# Patient Record
Sex: MALE | Race: BLACK OR AFRICAN AMERICAN | NOT HISPANIC OR LATINO | Employment: OTHER | URBAN - METROPOLITAN AREA
[De-identification: names, ages, dates, MRNs, and addresses within clinical notes are randomized per-mention and may not be internally consistent; named-entity substitution may affect disease eponyms.]

---

## 2020-09-18 ENCOUNTER — TELEPHONE (OUTPATIENT)
Dept: PAIN MEDICINE | Facility: CLINIC | Age: 70
End: 2020-09-18

## 2020-09-18 NOTE — TELEPHONE ENCOUNTER
Tried to call to r/s appt on 09/29, due to dr out . No answer. Left   Alexis Boyd, MA [facility]   [facility]

## 2020-09-24 ENCOUNTER — OFFICE VISIT (OUTPATIENT)
Dept: PAIN MEDICINE | Facility: CLINIC | Age: 70
End: 2020-09-24
Payer: MEDICARE

## 2020-09-24 VITALS
BODY MASS INDEX: 23.54 KG/M2 | HEIGHT: 69 IN | SYSTOLIC BLOOD PRESSURE: 157 MMHG | HEART RATE: 93 BPM | DIASTOLIC BLOOD PRESSURE: 84 MMHG | WEIGHT: 158.94 LBS

## 2020-09-24 DIAGNOSIS — M47.812 CERVICAL FACET JOINT SYNDROME: ICD-10-CM

## 2020-09-24 DIAGNOSIS — M79.18 MYOFASCIAL MUSCLE PAIN: ICD-10-CM

## 2020-09-24 DIAGNOSIS — M47.812 CERVICAL SPONDYLOSIS: Primary | ICD-10-CM

## 2020-09-24 PROCEDURE — 99213 PR OFFICE/OUTPT VISIT, EST, LEVL III, 20-29 MIN: ICD-10-PCS | Mod: S$PBB,,, | Performed by: ANESTHESIOLOGY

## 2020-09-24 PROCEDURE — 99214 OFFICE O/P EST MOD 30 MIN: CPT | Mod: PBBFAC | Performed by: ANESTHESIOLOGY

## 2020-09-24 PROCEDURE — 99999 PR PBB SHADOW E&M-EST. PATIENT-LVL IV: ICD-10-PCS | Mod: PBBFAC,,, | Performed by: ANESTHESIOLOGY

## 2020-09-24 PROCEDURE — 99999 PR PBB SHADOW E&M-EST. PATIENT-LVL IV: CPT | Mod: PBBFAC,,, | Performed by: ANESTHESIOLOGY

## 2020-09-24 PROCEDURE — 99213 OFFICE O/P EST LOW 20 MIN: CPT | Mod: S$PBB,,, | Performed by: ANESTHESIOLOGY

## 2020-09-24 RX ORDER — OXYCODONE HCL 20 MG/1
20 TABLET, FILM COATED, EXTENDED RELEASE ORAL
COMMUNITY

## 2020-09-24 RX ORDER — OXYCODONE HYDROCHLORIDE 30 MG/1
30 TABLET, FILM COATED, EXTENDED RELEASE ORAL
COMMUNITY

## 2020-09-24 RX ORDER — METOPROLOL TARTRATE 50 MG/1
50 TABLET ORAL 2 TIMES DAILY
COMMUNITY

## 2020-09-24 RX ORDER — TIZANIDINE 2 MG/1
2 TABLET ORAL
COMMUNITY
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
## 2020-09-30 ENCOUNTER — TELEPHONE (OUTPATIENT)
Dept: PAIN MEDICINE | Facility: CLINIC | Age: 70
End: 2020-09-30

## 2020-09-30 DIAGNOSIS — M79.18 MYOFASCIAL MUSCLE PAIN: ICD-10-CM

## 2020-09-30 DIAGNOSIS — M47.812 CERVICAL FACET JOINT SYNDROME: ICD-10-CM

## 2020-09-30 DIAGNOSIS — M47.812 CERVICAL SPONDYLOSIS: Primary | ICD-10-CM

## 2020-09-30 NOTE — TELEPHONE ENCOUNTER
----- Message from Kandace Mccall LPN sent at 9/30/2020  2:38 PM CDT -----  Contact: [phone] self    ----- Message -----  From: Thuy Edwards  Sent: 9/30/2020   2:18 PM CDT  To: [facility] Staff    Patient would like to consult with nurse regarding an referral for a physical therapy  . Please call back at [phone]. Thanks

## 2020-10-06 ENCOUNTER — CLINICAL SUPPORT (OUTPATIENT)
Dept: REHABILITATION | Facility: HOSPITAL | Age: 70
End: 2020-10-06
Payer: MEDICARE

## 2020-10-06 DIAGNOSIS — R29.898 DECREASED RANGE OF MOTION OF NECK: ICD-10-CM

## 2020-10-06 DIAGNOSIS — R29.3 POOR POSTURE: ICD-10-CM

## 2020-10-06 DIAGNOSIS — M47.812 CERVICAL SPONDYLOSIS: ICD-10-CM

## 2020-10-06 DIAGNOSIS — M47.812 CERVICAL FACET JOINT SYNDROME: ICD-10-CM

## 2020-10-06 DIAGNOSIS — M62.81 PROXIMAL MUSCLE WEAKNESS: ICD-10-CM

## 2020-10-06 DIAGNOSIS — M79.18 MYOFASCIAL MUSCLE PAIN: ICD-10-CM

## 2020-10-06 PROCEDURE — 97110 THERAPEUTIC EXERCISES: CPT

## 2020-10-06 PROCEDURE — 97140 MANUAL THERAPY 1/> REGIONS: CPT

## 2020-10-06 PROCEDURE — 97162 PT EVAL MOD COMPLEX 30 MIN: CPT

## 2020-10-06 NOTE — PATIENT INSTRUCTIONS
BED MOBILITY: Supine to Sit    When attempting to move from lying on your back to a seated position, first roll completely onto your side. Move your legs to edge of bed.  Push down with using both hands/elbows while moving legs off bed at the same time to reach sitting position.        *All images listed above obtained from [email or web address]
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
## 2020-10-13 ENCOUNTER — CLINICAL SUPPORT (OUTPATIENT)
Dept: REHABILITATION | Facility: HOSPITAL | Age: 70
End: 2020-10-13
Payer: MEDICARE

## 2020-10-13 DIAGNOSIS — R29.3 POOR POSTURE: ICD-10-CM

## 2020-10-13 DIAGNOSIS — M62.81 PROXIMAL MUSCLE WEAKNESS: ICD-10-CM

## 2020-10-13 DIAGNOSIS — R29.898 DECREASED RANGE OF MOTION OF NECK: ICD-10-CM

## 2020-10-13 PROCEDURE — 97110 THERAPEUTIC EXERCISES: CPT

## 2020-10-13 PROCEDURE — 97140 MANUAL THERAPY 1/> REGIONS: CPT

## 2020-10-13 NOTE — PROGRESS NOTES
Physical Therapy Daily Treatment Note     Name: Bhupinder Bowling Jr.  Clinic Number: [id number]    Therapy Diagnosis:   Encounter Diagnoses   Name Primary?    Decreased range of motion of neck     Proximal muscle weakness     Poor posture      Physician: Leslie Alvarez PA*    Visit Date: 10/13/2020    Physician Orders: PT Eval and Treat  Medical Diagnosis from Referral: cervical spondylosis, cervical facet joint syndrome, myofascial muscle pain   Evaluation Date: 10/6/2020  Authorization Period Expiration: 9/30/2021  Plan of Care Expiration: 1/4/2021  Visit # / Visits authorized: 1/1     Precautions: Standard and hard of hearing     Time In: 10:00 am  Time Out: 10:45 am  Total Billable Time: 45 minutes    SUBJECTIVE     Pt reports: no significant changes since initial evaluation.  He was compliant with home exercise program.  Response to previous treatment: soreness following manual therapy   Functional change: increased cervical rotation ROM noted     Pre-Treatment Pain: 8/10  Post-Treatment Pain: 8/10, soreness  Location: neck  TREATMENT     Bhupinder received therapeutic exercises to develop strength, ROM, flexibility and core stabilization for 25 minutes including: (interventions performed today listed in bold)    Exercise    Rope and pulley  3 minutes in each direction  Flexion, abduction, extension    Upper trapezius stretch  1 minute B, performed passively by therapist    Supine cervical rotation  30x B    Supine cervical flexion and extension  30 x    Shoulder flexion AAROM with dowel  20 x supine    Scapular retractions  Seated, 30x                Bhupinder received the following manual therapy techniques: Soft tissue Mobilization were applied to the: neck and shoulders for 20 minutes, including:  STM of bilateral suboccipital muscles , cervical paraspinals, upper trapezius, levator scapulae , scalenes , SCM, supraspinatus , infraspinatus  and periscapular musculature      Home Exercises Provided and Patient  Education Provided       Written Home Exercises Provided: yes.  Exercises were reviewed and Bhupinder was able to demonstrate them prior to the end of the session.  Bhupinder demonstrated fair  understanding of the education provided.     See EMR under Patient Instructions for exercises provided prior visit.    ASSESSMENT   Pt tolerated manual therapy well with reports of tolerable discomfort during intervention and states decreased tension but increased soreness following intervention. Pt tolerated exercise well with reports of increased fatigue and tolerable discomfort throughout session. Patient demonstrates improved cervical range of motion following manual therapy and exercises performed. Patient told to continue HEP provided on initial evaluation.      Bhupinder is progressing well towards his goals.   Pt prognosis is Good.     Pt will continue to benefit from skilled outpatient physical therapy to address the deficits listed in the problem list box on initial evaluation, provide pt/family education and to maximize pt's level of independence in the home and community environment.     Pt's spiritual, cultural and educational needs considered and pt agreeable to plan of care and goals.     Anticipated Barriers for therapy: sedentary lifestyle, lack of understanding of condition and adherence to treatment plan    GOALS:     Short Term Goals:  6 weeks     1. Pain: Pt will demonstrate improved pain by reports of less than or equal to 6/10 worst pain on the verbal rating scale in order to progress toward maximal functional ability and improve QOL.     2. Function: Patient will demonstrate improved function as indicated by a functional limitation score of less than or equal to 56 out of 100 on FOTO.     3. Mobility: Patient will improve AROM to 50% of stated goals, listed in objective measures above, in order to progress towards independence with functional activities.      4. Strength: Patient will improve strength to 50% of  stated goals, listed in objective measures above, in order to progress towards independence with functional activities.      5. HEP: Patient will demonstrate independence with HEP in order to progress toward functional independence.        Long Term Goals:  12 weeks     1. Pain: Pt will demonstrate improved pain by reports of less than or equal to 3/10 worst pain on the verbal rating scale in order to progress toward maximal functional ability and improve QOL.       2. Function: Patient will demonstrate improved function as indicated by a functional limitation score of less than or equal to 48 out of 100 on FOTO.     3. Mobility: Patient will improve AROM to stated goals, listed in objective measures above, in order to return to maximal functional potential and improve quality of life.     4. Strength: Patient will improve strength to stated goals, listed in objective measures above, in order to improve functional independence and quality of life.     5. Patient will return to normal ADL's, IADL's, community involvement, recreational activities, and work-related activities with less than or equal to 3/10 pain and maximal function.             PLAN   Continue Plan of Care (POC) and progress per patient tolerance.    Evaluation: 10/6/2020  POC Expiration: 1/4/2021    Jenni Rowell, PT, DPT

## 2020-10-15 ENCOUNTER — CLINICAL SUPPORT (OUTPATIENT)
Dept: REHABILITATION | Facility: HOSPITAL | Age: 70
End: 2020-10-15
Payer: MEDICARE

## 2020-10-15 DIAGNOSIS — R29.898 DECREASED RANGE OF MOTION OF NECK: ICD-10-CM

## 2020-10-15 DIAGNOSIS — R29.3 POOR POSTURE: ICD-10-CM

## 2020-10-15 DIAGNOSIS — M62.81 PROXIMAL MUSCLE WEAKNESS: ICD-10-CM

## 2020-10-15 PROCEDURE — 97140 MANUAL THERAPY 1/> REGIONS: CPT

## 2020-10-15 PROCEDURE — 97110 THERAPEUTIC EXERCISES: CPT

## 2020-10-15 NOTE — PROGRESS NOTES
Physical Therapy Daily Treatment Note     Name: Bhupinder Bowling Jr.  Clinic Number: [id number]    Therapy Diagnosis:   Encounter Diagnoses   Name Primary?    Decreased range of motion of neck     Proximal muscle weakness     Poor posture      Physician: Leslie Alvarez PA*    Visit Date: 10/15/2020    Physician Orders: PT Eval and Treat  Medical Diagnosis from Referral: cervical spondylosis, cervical facet joint syndrome, myofascial muscle pain   Evaluation Date: 10/6/2020  Authorization Period Expiration: 9/30/2021  Plan of Care Expiration: 1/4/2021  Visit # / Visits authorized: 3 (2 of 20)      Precautions: Standard and hard of hearing     Time In: 10:15 am  Time Out: 11:00 am  Total Billable Time: 45 minutes    SUBJECTIVE     Pt reports: his neck continues to feel stiff and sore. States that he holds tension in his neck and shoulders throughout the day due to pain in the neck.   He was compliant with home exercise program.  Response to previous treatment: decreased tension in musculature following manual therapy.   Functional change: increased cervical rotation ROM noted     Pre-Treatment Pain: 7/10  Post-Treatment Pain: 7/10, soreness  Location: neck  TREATMENT     Bhupinder received therapeutic exercises to develop strength, ROM, flexibility and core stabilization for 25 minutes including: (interventions performed today listed in bold)    Exercise    Rope and pulley  3 minutes in each direction  Flexion, abduction   Upper trapezius stretch  1 minute B, performed passively by therapist    Seated cervical rotation  30x B    Seated cervical flexion and extension  30 x    Shoulder flexion AAROM with dowel  20x supine    Scapular retractions  Seated, 30x    Side lying shoulder ER  2 x 10 B           Bhupinder received the following manual therapy techniques: Soft tissue Mobilization were applied to the: neck and shoulders for 20 minutes, including:  STM of bilateral suboccipital muscles , cervical paraspinals, upper  trapezius, levator scapulae , scalenes , SCM, supraspinatus , infraspinatus  and periscapular musculature   Grade II forward nod and backward nod C0-C1      Home Exercises Provided and Patient Education Provided       Written Home Exercises Provided: yes.  Exercises were reviewed and Bhupinder was able to demonstrate them prior to the end of the session.  Bhupinder demonstrated fair  understanding of the education provided.     See EMR under Patient Instructions for exercises provided prior visit.    ASSESSMENT   Pt tolerated manual therapy well with reports of tolerable discomfort during intervention and states decreased tension but increased soreness following intervention. He requires continues cueing to decrease guarding and relax throughout intervention. He states that he notices that he holds tension in the neck and shoulders throughout the day. Pt tolerated exercise well with reports of increased fatigue and tolerable discomfort throughout session. Patient demonstrates improved cervical range of motion following manual therapy and exercises performed, compared to beginning of session. Patient told to continue HEP provided on initial evaluation.      Bhupinder is progressing well towards his goals.   Pt prognosis is Good.     Pt will continue to benefit from skilled outpatient physical therapy to address the deficits listed in the problem list box on initial evaluation, provide pt/family education and to maximize pt's level of independence in the home and community environment.     Pt's spiritual, cultural and educational needs considered and pt agreeable to plan of care and goals.     Anticipated Barriers for therapy: sedentary lifestyle, lack of understanding of condition and adherence to treatment plan    GOALS:     Short Term Goals:  6 weeks     1. Pain: Pt will demonstrate improved pain by reports of less than or equal to 6/10 worst pain on the verbal rating scale in order to progress toward maximal functional  ability and improve QOL.     2. Function: Patient will demonstrate improved function as indicated by a functional limitation score of less than or equal to 56 out of 100 on FOTO.     3. Mobility: Patient will improve AROM to 50% of stated goals, listed in objective measures above, in order to progress towards independence with functional activities.      4. Strength: Patient will improve strength to 50% of stated goals, listed in objective measures above, in order to progress towards independence with functional activities.      5. HEP: Patient will demonstrate independence with HEP in order to progress toward functional independence.        Long Term Goals:  12 weeks     1. Pain: Pt will demonstrate improved pain by reports of less than or equal to 3/10 worst pain on the verbal rating scale in order to progress toward maximal functional ability and improve QOL.       2. Function: Patient will demonstrate improved function as indicated by a functional limitation score of less than or equal to 48 out of 100 on FOTO.     3. Mobility: Patient will improve AROM to stated goals, listed in objective measures above, in order to return to maximal functional potential and improve quality of life.     4. Strength: Patient will improve strength to stated goals, listed in objective measures above, in order to improve functional independence and quality of life.     5. Patient will return to normal ADL's, IADL's, community involvement, recreational activities, and work-related activities with less than or equal to 3/10 pain and maximal function.             PLAN   Continue Plan of Care (POC) and progress per patient tolerance.    Evaluation: 10/6/2020  POC Expiration: 1/4/2021    Jenni Rowell, PT, DPT

## 2020-10-20 ENCOUNTER — CLINICAL SUPPORT (OUTPATIENT)
Dept: REHABILITATION | Facility: HOSPITAL | Age: 70
End: 2020-10-20
Payer: MEDICARE

## 2020-10-20 DIAGNOSIS — R29.898 DECREASED RANGE OF MOTION OF NECK: ICD-10-CM

## 2020-10-20 DIAGNOSIS — R29.3 POOR POSTURE: ICD-10-CM

## 2020-10-20 DIAGNOSIS — M62.81 PROXIMAL MUSCLE WEAKNESS: ICD-10-CM

## 2020-10-20 PROCEDURE — 97110 THERAPEUTIC EXERCISES: CPT

## 2020-10-20 PROCEDURE — 97140 MANUAL THERAPY 1/> REGIONS: CPT

## 2020-10-20 NOTE — PROGRESS NOTES
Physical Therapy Daily Treatment Note     Name: Bhupinder Bowling Jr.  Clinic Number: [id number]    Therapy Diagnosis:   Encounter Diagnoses   Name Primary?    Decreased range of motion of neck     Proximal muscle weakness     Poor posture      Physician: Leslie Alvarez PA*    Visit Date: 10/20/2020    Physician Orders: PT Eval and Treat  Medical Diagnosis from Referral: cervical spondylosis, cervical facet joint syndrome, myofascial muscle pain   Evaluation Date: 10/6/2020  Authorization Period Expiration: 9/30/2021  Plan of Care Expiration: 1/4/2021  Visit # / Visits authorized: 4 (3 of 20)      Precautions: Standard and hard of hearing     Time In: 10:15 am  Time Out: 11:00 am  Total Billable Time: 45 minutes    SUBJECTIVE     Pt reports: he is noticing slow improvement in his neck pain and range of motion.   He was compliant with home exercise program.  Response to previous treatment: decreased tension in musculature following manual therapy.   Functional change: increased cervical rotation ROM noted     Pre-Treatment Pain: 6/10  Post-Treatment Pain: 6/10, soreness  Location: neck  TREATMENT     Bhupinder received therapeutic exercises to develop strength, ROM, flexibility and core stabilization for 35 minutes including: (interventions performed today listed in bold)    Exercise    Rope and pulley  3 minutes in each direction  Flexion, abduction   Upper trapezius stretch  1 minute B, performed passively by therapist    Seated cervical rotation  30x B    Seated cervical flexion and extension  30 x    Shoulder flexion AAROM with dowel  2# dowel, 20x supine    Scapular retractions  Seated, 30x    Side lying shoulder ER  30x B           Bhupinder received the following manual therapy techniques: Soft tissue Mobilization were applied to the: neck and shoulders for 10 minutes, including:  STM of bilateral suboccipital muscles , cervical paraspinals, upper trapezius, levator scapulae , scalenes , SCM, supraspinatus ,  infraspinatus  and periscapular musculature         Home Exercises Provided and Patient Education Provided       Written Home Exercises Provided: yes.  Exercises were reviewed and Bhupinder was able to demonstrate them prior to the end of the session.  Bhupinder demonstrated fair  understanding of the education provided.     See EMR under Patient Instructions for exercises provided prior visit.    ASSESSMENT   Pt tolerated manual therapy well with reports of tolerable discomfort during intervention and states decreased tension but increased soreness following intervention. He requires continues cueing to decrease guarding and relax throughout intervention. Pt tolerated exercise well with reports of increased fatigue and tolerable discomfort throughout session. Increased range of motion noted with cervical rotation. Mild progressions made with shoulder flexion AAROM and side lying external rotation; increased fatigue noted.     Bhupinder is progressing well towards his goals.   Pt prognosis is Good.     Pt will continue to benefit from skilled outpatient physical therapy to address the deficits listed in the problem list box on initial evaluation, provide pt/family education and to maximize pt's level of independence in the home and community environment.     Pt's spiritual, cultural and educational needs considered and pt agreeable to plan of care and goals.     Anticipated Barriers for therapy: sedentary lifestyle, lack of understanding of condition and adherence to treatment plan    GOALS:     Short Term Goals:  6 weeks     1. Pain: Pt will demonstrate improved pain by reports of less than or equal to 6/10 worst pain on the verbal rating scale in order to progress toward maximal functional ability and improve QOL.     2. Function: Patient will demonstrate improved function as indicated by a functional limitation score of less than or equal to 56 out of 100 on FOTO.     3. Mobility: Patient will improve AROM to 50% of stated  goals, listed in objective measures above, in order to progress towards independence with functional activities.      4. Strength: Patient will improve strength to 50% of stated goals, listed in objective measures above, in order to progress towards independence with functional activities.      5. HEP: Patient will demonstrate independence with HEP in order to progress toward functional independence.        Long Term Goals:  12 weeks     1. Pain: Pt will demonstrate improved pain by reports of less than or equal to 3/10 worst pain on the verbal rating scale in order to progress toward maximal functional ability and improve QOL.       2. Function: Patient will demonstrate improved function as indicated by a functional limitation score of less than or equal to 48 out of 100 on FOTO.     3. Mobility: Patient will improve AROM to stated goals, listed in objective measures above, in order to return to maximal functional potential and improve quality of life.     4. Strength: Patient will improve strength to stated goals, listed in objective measures above, in order to improve functional independence and quality of life.     5. Patient will return to normal ADL's, IADL's, community involvement, recreational activities, and work-related activities with less than or equal to 3/10 pain and maximal function.             PLAN   Continue Plan of Care (POC) and progress per patient tolerance.    Evaluation: 10/6/2020  POC Expiration: 1/4/2021    Jenni Rowell PT, DPT

## 2020-10-22 ENCOUNTER — CLINICAL SUPPORT (OUTPATIENT)
Dept: REHABILITATION | Facility: HOSPITAL | Age: 70
End: 2020-10-22
Payer: MEDICARE

## 2020-10-22 DIAGNOSIS — R29.3 POOR POSTURE: ICD-10-CM

## 2020-10-22 DIAGNOSIS — R29.898 DECREASED RANGE OF MOTION OF NECK: ICD-10-CM

## 2020-10-22 DIAGNOSIS — M62.81 PROXIMAL MUSCLE WEAKNESS: ICD-10-CM

## 2020-10-22 PROCEDURE — 97140 MANUAL THERAPY 1/> REGIONS: CPT

## 2020-10-22 PROCEDURE — 97110 THERAPEUTIC EXERCISES: CPT

## 2020-10-22 NOTE — PROGRESS NOTES
Physical Therapy Daily Treatment Note     Name: Bhupinder Bowling Jr.  Clinic Number: [id number]    Therapy Diagnosis:   Encounter Diagnoses   Name Primary?    Decreased range of motion of neck     Proximal muscle weakness     Poor posture      Physician: Leslie Alvarez PA*    Visit Date: 10/22/2020    Physician Orders: PT Eval and Treat  Medical Diagnosis from Referral: cervical spondylosis, cervical facet joint syndrome, myofascial muscle pain   Evaluation Date: 10/6/2020  Authorization Period Expiration: 9/30/2021  Plan of Care Expiration: 1/4/2021  Visit # / Visits authorized: 5 (4 of 20)      Precautions: Standard and hard of hearing     Time In: 10:15 am  Time Out: 11:08 am  Total Billable Time: 53 minutes    SUBJECTIVE     Pt reports: no significant changes today. States when he goes walking he fe  He was compliant with home exercise program. States he always feels great for the rest of the day following physical therapy but states that his neck always seems to tighten back up by the next morning. States his neck pain, headaches, and radiating pain down his arm have decreased significantly since starting physical therapy.   Response to previous treatment: decreased tension in musculature following manual therapy.   Functional change: none noted     Pre-Treatment Pain: 6/10  Post-Treatment Pain: 4/10, soreness  Location: neck  TREATMENT     Bhupinder received therapeutic exercises to develop strength, ROM, flexibility and core stabilization for 36 minutes including: (interventions performed today listed in bold)    Exercise    Rope and pulley  3 minutes in each direction  Flexion, abduction   UBE (for scapular strength and endurance)  Level 1, 3 minutes forward, 3 minutes backward    Upper trapezius stretch  1 minute B, performed passively by therapist    Supine cervical rotation  30x B    Seated cervical flexion and extension  30 x    Shoulder flexion AAROM with dowel  3# dowel, 20x supine    Scapular  retractions  Seated, 30x    Side lying shoulder ER  30x B   [employer] 15x B with manual assist for increased thoracic rotation        Bhupinder received the following manual therapy techniques: Soft tissue Mobilization were applied to the: neck and shoulders for 17 minutes, including:  STM of bilateral suboccipital muscles , cervical paraspinals, upper trapezius, levator scapulae , scalenes , SCM, supraspinatus , infraspinatus  and periscapular musculature   Manual cervical traction      Home Exercises Provided and Patient Education Provided       Written Home Exercises Provided: yes.  Exercises were reviewed and Bhupinder was able to demonstrate them prior to the end of the session.  Bhupinder demonstrated fair  understanding of the education provided.     See EMR under Patient Instructions for exercises provided prior visit.    ASSESSMENT   Pt tolerated manual therapy well with reports of tolerable discomfort during intervention and states decreased tension and soreness following intervention. but increased soreness following intervention. He requires continues cueing to decrease guarding and relax throughout intervention. Pt tolerated exercise well with reports of increased fatigue and tolerable discomfort throughout session. States he feels very relaxed and significant decrease in pain and stiffness following session.     Bhupinder is progressing well towards his goals.   Pt prognosis is Good.     Pt will continue to benefit from skilled outpatient physical therapy to address the deficits listed in the problem list box on initial evaluation, provide pt/family education and to maximize pt's level of independence in the home and community environment.     Pt's spiritual, cultural and educational needs considered and pt agreeable to plan of care and goals.     Anticipated Barriers for therapy: sedentary lifestyle, lack of understanding of condition and adherence to treatment plan    GOALS:     Short Term Goals:  6 weeks      1. Pain: Pt will demonstrate improved pain by reports of less than or equal to 6/10 worst pain on the verbal rating scale in order to progress toward maximal functional ability and improve QOL.     2. Function: Patient will demonstrate improved function as indicated by a functional limitation score of less than or equal to 56 out of 100 on FOTO.     3. Mobility: Patient will improve AROM to 50% of stated goals, listed in objective measures above, in order to progress towards independence with functional activities.      4. Strength: Patient will improve strength to 50% of stated goals, listed in objective measures above, in order to progress towards independence with functional activities.      5. HEP: Patient will demonstrate independence with HEP in order to progress toward functional independence.        Long Term Goals:  12 weeks     1. Pain: Pt will demonstrate improved pain by reports of less than or equal to 3/10 worst pain on the verbal rating scale in order to progress toward maximal functional ability and improve QOL.       2. Function: Patient will demonstrate improved function as indicated by a functional limitation score of less than or equal to 48 out of 100 on FOTO.     3. Mobility: Patient will improve AROM to stated goals, listed in objective measures above, in order to return to maximal functional potential and improve quality of life.     4. Strength: Patient will improve strength to stated goals, listed in objective measures above, in order to improve functional independence and quality of life.     5. Patient will return to normal ADL's, IADL's, community involvement, recreational activities, and work-related activities with less than or equal to 3/10 pain and maximal function.             PLAN   Continue Plan of Care (POC) and progress per patient tolerance.    Evaluation: 10/6/2020  POC Expiration: 1/4/2021    Jenni Rowell, PT, DPT

## 2020-10-27 ENCOUNTER — CLINICAL SUPPORT (OUTPATIENT)
Dept: REHABILITATION | Facility: HOSPITAL | Age: 70
End: 2020-10-27
Payer: MEDICARE

## 2020-10-27 DIAGNOSIS — M62.81 PROXIMAL MUSCLE WEAKNESS: ICD-10-CM

## 2020-10-27 DIAGNOSIS — R29.898 DECREASED RANGE OF MOTION OF NECK: ICD-10-CM

## 2020-10-27 DIAGNOSIS — R29.3 POOR POSTURE: ICD-10-CM

## 2020-10-27 PROCEDURE — 97110 THERAPEUTIC EXERCISES: CPT

## 2020-10-27 PROCEDURE — 97140 MANUAL THERAPY 1/> REGIONS: CPT

## 2020-10-27 NOTE — PROGRESS NOTES
Physical Therapy Daily Treatment Note     Name: Bhupinder Bowling Jr.  Clinic Number: [id number]    Therapy Diagnosis:   Encounter Diagnoses   Name Primary?    Decreased range of motion of neck     Proximal muscle weakness     Poor posture      Physician: Leslie Alvarez PA*    Visit Date: 10/27/2020    Physician Orders: PT Eval and Treat  Medical Diagnosis from Referral: cervical spondylosis, cervical facet joint syndrome, myofascial muscle pain   Evaluation Date: 10/6/2020  Authorization Period Expiration: 9/30/2021  Plan of Care Expiration: 1/4/2021  Visit # / Visits authorized: 6 (5 of 20)      Precautions: Standard and hard of hearing     Time In: 10:00 am  Time Out: 10:45 am  Total Billable Time: 45 minutes    SUBJECTIVE     Pt reports: his neck continues to feel better with each session.   He was not compliant with home exercise program   Response to previous treatment: decreased tension in musculature following manual therapy.   Functional change: improved cervical range of motion with decreased pain.     Pre-Treatment Pain: 6/10  Post-Treatment Pain: 4/10, soreness  Location: neck  TREATMENT     Bhupinder received therapeutic exercises to develop strength, ROM, flexibility and core stabilization for 35 minutes including: (interventions performed today listed in bold)    Exercise    Rope and pulley  3 minutes in each direction  Flexion, abduction   UBE (for scapular strength and endurance)  Level 1, 3 minutes forward, 3 minutes backward    Upper trapezius stretch  1 minute B, performed passively by therapist    Supine cervical rotation  30x B    Seated cervical flexion and extension  30 x    Shoulder flexion AAROM with dowel  20x seated   Scapular retractions  Seated, 30x    Side lying shoulder ER  30x B   Open Books 15x B with manual assist for increased thoracic rotation        Bhupinder received the following manual therapy techniques: Soft tissue Mobilization were applied to the: neck and shoulders for 10  minutes, including:  STM of bilateral suboccipital muscles , cervical paraspinals, upper trapezius, levator scapulae , scalenes , SCM, supraspinatus , infraspinatus  and periscapular musculature   Manual cervical traction      Home Exercises Provided and Patient Education Provided       Written Home Exercises Provided: yes.  Exercises were reviewed and Bhupinder was able to demonstrate them prior to the end of the session.  Bhupinder demonstrated fair  understanding of the education provided.     See EMR under Patient Instructions for exercises provided prior visit.    ASSESSMENT   Pt tolerated manual therapy well with reports of tolerable discomfort during intervention and states decreased tension and increased muscle soreness following intervention. He tolerated all exercises well with reports of tolerable discomfort throughout. Improved range of motion noted with cervical rotation and flexion/extension AROM. He continues to require cueing to decrease guarding and relax throughout intervention. Pt tolerated exercise well with reports of increased fatigue and tolerable discomfort throughout session. States he feels very relaxed and significant decrease in pain and stiffness following session.     Bhupinder is progressing well towards his goals.   Pt prognosis is Good.     Pt will continue to benefit from skilled outpatient physical therapy to address the deficits listed in the problem list box on initial evaluation, provide pt/family education and to maximize pt's level of independence in the home and community environment.     Pt's spiritual, cultural and educational needs considered and pt agreeable to plan of care and goals.     Anticipated Barriers for therapy: sedentary lifestyle, lack of understanding of condition and adherence to treatment plan    GOALS:     Short Term Goals:  6 weeks     1. Pain: Pt will demonstrate improved pain by reports of less than or equal to 6/10 worst pain on the verbal rating scale in order to  progress toward maximal functional ability and improve QOL.     2. Function: Patient will demonstrate improved function as indicated by a functional limitation score of less than or equal to 56 out of 100 on FOTO.     3. Mobility: Patient will improve AROM to 50% of stated goals, listed in objective measures above, in order to progress towards independence with functional activities.      4. Strength: Patient will improve strength to 50% of stated goals, listed in objective measures above, in order to progress towards independence with functional activities.      5. HEP: Patient will demonstrate independence with HEP in order to progress toward functional independence.        Long Term Goals:  12 weeks     1. Pain: Pt will demonstrate improved pain by reports of less than or equal to 3/10 worst pain on the verbal rating scale in order to progress toward maximal functional ability and improve QOL.       2. Function: Patient will demonstrate improved function as indicated by a functional limitation score of less than or equal to 48 out of 100 on FOTO.     3. Mobility: Patient will improve AROM to stated goals, listed in objective measures above, in order to return to maximal functional potential and improve quality of life.     4. Strength: Patient will improve strength to stated goals, listed in objective measures above, in order to improve functional independence and quality of life.     5. Patient will return to normal ADL's, IADL's, community involvement, recreational activities, and work-related activities with less than or equal to 3/10 pain and maximal function.             PLAN   Continue Plan of Care (POC) and progress per patient tolerance.    Evaluation: 10/6/2020  POC Expiration: 1/4/2021    Jneni Rowell, PT, DPT

## 2020-10-29 ENCOUNTER — CLINICAL SUPPORT (OUTPATIENT)
Dept: REHABILITATION | Facility: HOSPITAL | Age: 70
End: 2020-10-29
Payer: MEDICARE

## 2020-10-29 DIAGNOSIS — M62.81 PROXIMAL MUSCLE WEAKNESS: ICD-10-CM

## 2020-10-29 DIAGNOSIS — R29.898 DECREASED RANGE OF MOTION OF NECK: ICD-10-CM

## 2020-10-29 DIAGNOSIS — R29.3 POOR POSTURE: ICD-10-CM

## 2020-10-29 PROCEDURE — 97112 NEUROMUSCULAR REEDUCATION: CPT

## 2020-10-29 PROCEDURE — 97140 MANUAL THERAPY 1/> REGIONS: CPT

## 2020-10-29 PROCEDURE — 97110 THERAPEUTIC EXERCISES: CPT

## 2020-10-29 NOTE — PROGRESS NOTES
Physical Therapy Daily Treatment Note     Name: Bhupinder Bowling Jr.  Clinic Number: [id number]    Therapy Diagnosis:   Encounter Diagnoses   Name Primary?    Decreased range of motion of neck     Proximal muscle weakness     Poor posture      Physician: Leslie Alvarez PA*    Visit Date: 10/29/2020    Physician Orders: PT Eval and Treat  Medical Diagnosis from Referral: cervical spondylosis, cervical facet joint syndrome, myofascial muscle pain   Evaluation Date: 10/6/2020  Authorization Period Expiration: 9/30/2021  Plan of Care Expiration: 1/4/2021  Visit # / Visits authorized: 7 (6 of 20)      Precautions: Standard and hard of hearing     Time In: 10:15 am  Time Out: 11:00 am  Total Billable Time: 45 minutes    SUBJECTIVE     Pt reports: his neck continues to feel better with each session.   He was not compliant with home exercise program   Response to previous treatment: decreased tension in musculature following manual therapy.   Functional change: improved cervical range of motion with decreased pain.     Pre-Treatment Pain: 5/10  Post-Treatment Pain: 4/10, soreness  Location: neck  TREATMENT     Bhupinder received therapeutic exercises to develop strength, ROM, flexibility and core stabilization for 25 minutes including: (interventions performed today listed in bold)    Exercise    Rope and pulley  3 minutes in each direction  Flexion, abduction   UBE (for scapular strength and endurance)  Level 1, 3 minutes forward, 3 minutes backward    Upper trapezius stretch  1 minute B, performed passively by therapist    Seated cervical rotation  30x B    Seated cervical flexion and extension  30x    Seated cervical side bending  30x B    Shoulder flexion AAROM with dowel  20x seated   Open Books 15x B with manual assist for increased thoracic rotation              Bhupinder participated in neuromuscular re-education activities to improve: Proprioception and Posture for 10 minutes. The following activities were included:  (interventions performed today listed in bold)    Exercise    Cervical isometrics 20 x in each direction  Flexion, side bending and retractions    Scapular Retractions Seated, 30x    B Shoulder ER  Yellow band, 3 x 10                        x = exercise details same as prior session    Bhupinder received the following manual therapy techniques: Soft tissue Mobilization were applied to the: neck and shoulders for 10 minutes, including:  STM of bilateral suboccipital muscles , cervical paraspinals, upper trapezius, levator scapulae , scalenes , SCM, supraspinatus , infraspinatus  and periscapular musculature       Home Exercises Provided and Patient Education Provided       Written Home Exercises Provided: yes.  Exercises were reviewed and Bhupinder was able to demonstrate them prior to the end of the session.  Bhupinder demonstrated fair  understanding of the education provided.     See EMR under Patient Instructions for exercises provided prior visit.    ASSESSMENT   Pt tolerated manual therapy well with reports of tolerable discomfort during intervention and states decreased tension and increased muscle soreness following intervention. He tolerated all exercises well with reports of tolerable discomfort throughout. He continues to demonstrate improved AROM with cervical rotation and flexion/extension AROM. He continues to require cueing to decrease guarding and relax throughout session. Pt tolerated exercises and progressions well with reports of increased fatigue and tolerable discomfort throughout session. States he feels very relaxed and significant decrease in pain and stiffness following session.     Bhupinder is progressing well towards his goals.   Pt prognosis is Good.     Pt will continue to benefit from skilled outpatient physical therapy to address the deficits listed in the problem list box on initial evaluation, provide pt/family education and to maximize pt's level of independence in the home and community  environment.     Pt's spiritual, cultural and educational needs considered and pt agreeable to plan of care and goals.     Anticipated Barriers for therapy: sedentary lifestyle, lack of understanding of condition and adherence to treatment plan    GOALS:     Short Term Goals:  6 weeks     1. Pain: Pt will demonstrate improved pain by reports of less than or equal to 6/10 worst pain on the verbal rating scale in order to progress toward maximal functional ability and improve QOL.     2. Function: Patient will demonstrate improved function as indicated by a functional limitation score of less than or equal to 56 out of 100 on FOTO.     3. Mobility: Patient will improve AROM to 50% of stated goals, listed in objective measures above, in order to progress towards independence with functional activities.      4. Strength: Patient will improve strength to 50% of stated goals, listed in objective measures above, in order to progress towards independence with functional activities.      5. HEP: Patient will demonstrate independence with HEP in order to progress toward functional independence.        Long Term Goals:  12 weeks     1. Pain: Pt will demonstrate improved pain by reports of less than or equal to 3/10 worst pain on the verbal rating scale in order to progress toward maximal functional ability and improve QOL.       2. Function: Patient will demonstrate improved function as indicated by a functional limitation score of less than or equal to 48 out of 100 on FOTO.     3. Mobility: Patient will improve AROM to stated goals, listed in objective measures above, in order to return to maximal functional potential and improve quality of life.     4. Strength: Patient will improve strength to stated goals, listed in objective measures above, in order to improve functional independence and quality of life.     5. Patient will return to normal ADL's, IADL's, community involvement, recreational activities, and work-related  activities with less than or equal to 3/10 pain and maximal function.             PLAN   Continue Plan of Care (POC) and progress per patient tolerance.    Evaluation: 10/6/2020  POC Expiration: 1/4/2021    Jenni Rowell PT, DPT

## 2020-11-03 ENCOUNTER — CLINICAL SUPPORT (OUTPATIENT)
Dept: REHABILITATION | Facility: HOSPITAL | Age: 70
End: 2020-11-03
Payer: MEDICARE

## 2020-11-03 DIAGNOSIS — M62.81 PROXIMAL MUSCLE WEAKNESS: ICD-10-CM

## 2020-11-03 DIAGNOSIS — R29.898 DECREASED RANGE OF MOTION OF NECK: ICD-10-CM

## 2020-11-03 DIAGNOSIS — R29.3 POOR POSTURE: ICD-10-CM

## 2020-11-03 PROCEDURE — 97112 NEUROMUSCULAR REEDUCATION: CPT

## 2020-11-03 PROCEDURE — 97140 MANUAL THERAPY 1/> REGIONS: CPT

## 2020-11-03 PROCEDURE — 97110 THERAPEUTIC EXERCISES: CPT

## 2020-11-03 NOTE — PROGRESS NOTES
Physical Therapy Daily Treatment Note     Name: Bhupinder Bowling Jr.  Clinic Number: [id number]    Therapy Diagnosis:   Encounter Diagnoses   Name Primary?    Decreased range of motion of neck     Proximal muscle weakness     Poor posture      Physician: Leslie Alvarez PA*    Visit Date: 11/3/2020    Physician Orders: PT Eval and Treat  Medical Diagnosis from Referral: cervical spondylosis, cervical facet joint syndrome, myofascial muscle pain   Evaluation Date: 10/6/2020  Authorization Period Expiration: 9/30/2021  Plan of Care Expiration: 1/4/2021  Visit # / Visits authorized: 8 (7 of 20)      Precautions: Standard and hard of hearing     Time In: 10:00 am  Time Out: 10:45 am  Total Billable Time: 45 minutes    SUBJECTIVE     Pt reports: his neck continues to feel better with each session. States that he is most uncomfortable when sitting for long periods of time and that he starts to feel like there is a pressure on his head and neck. Because of this, he tries to move around as much as he can throughout the day. States that he is most comfortable when laying in bed with his neck brace on but states that he knows he cannot do this all day because it will make things worse.   He was not compliant with home exercise program   Response to previous treatment: decreased tension in musculature following manual therapy.   Functional change: none noted     Pre-Treatment Pain: 5/10  Post-Treatment Pain: 4/10, soreness  Location: neck  TREATMENT     Bhupinder received therapeutic exercises to develop strength, ROM, flexibility and core stabilization for 25 minutes including: (interventions performed today listed in bold)    Exercise    Rope and pulley  3 minutes in each direction  Flexion, abduction   UBE (for scapular strength and endurance)  Level 1, 3 minutes forward, 3 minutes backward    Upper trapezius stretch  1 minute B, performed passively by therapist    Seated cervical rotation  30x B    Seated cervical flexion  and extension  30x    Seated cervical side bending  30x B    Shoulder flexion AAROM with dowel  20x seated   Open Books 15x B with manual assist for increased thoracic rotation    Posterior shoulder rolls  30x   Series 6 on airex pad 2 minute pec stretch   1 minute all other motions         Bhupinder participated in neuromuscular re-education activities to improve: Proprioception and Posture for 8 minutes. The following activities were included: (interventions performed today listed in bold)    Exercise    Cervical isometrics 20 x in each direction  Flexion, side bending and retractions    Scapular Retractions Seated, 30x    B Shoulder ER  Yellow band, 3 x 10                        x = exercise details same as prior session    Bhupinder received the following manual therapy techniques: Soft tissue Mobilization were applied to the: neck and shoulders for 12 minutes, including:  STM of bilateral suboccipital muscles , cervical paraspinals, upper trapezius, levator scapulae , scalenes , SCM, supraspinatus , infraspinatus  and periscapular musculature     Patient educated on use of a lumbar roll in order to help maintain neutral position of his spine while seated for prolonged periods of time.     Home Exercises Provided and Patient Education Provided       Written Home Exercises Provided: yes.  Exercises were reviewed and Bhupinder was able to demonstrate them prior to the end of the session.  Bhupinder demonstrated fair  understanding of the education provided.     See EMR under Patient Instructions for exercises provided prior visit.    ASSESSMENT   Pt tolerated manual therapy well with reports of tolerable discomfort during intervention and states decreased tension and increased muscle soreness following intervention. He tolerated all exercises well with reports of tolerable discomfort throughout session. He continues to require cueing to decrease guarding and relax throughout session. Pt tolerated exercises and progressions well  with reports of increased fatigue and tolerable discomfort throughout session.     Bhupinder is progressing well towards his goals.   Pt prognosis is Good.     Pt will continue to benefit from skilled outpatient physical therapy to address the deficits listed in the problem list box on initial evaluation, provide pt/family education and to maximize pt's level of independence in the home and community environment.     Pt's spiritual, cultural and educational needs considered and pt agreeable to plan of care and goals.     Anticipated Barriers for therapy: sedentary lifestyle, lack of understanding of condition and adherence to treatment plan    GOALS:     Short Term Goals:  6 weeks     1. Pain: Pt will demonstrate improved pain by reports of less than or equal to 6/10 worst pain on the verbal rating scale in order to progress toward maximal functional ability and improve QOL.     2. Function: Patient will demonstrate improved function as indicated by a functional limitation score of less than or equal to 56 out of 100 on FOTO.     3. Mobility: Patient will improve AROM to 50% of stated goals, listed in objective measures above, in order to progress towards independence with functional activities.      4. Strength: Patient will improve strength to 50% of stated goals, listed in objective measures above, in order to progress towards independence with functional activities.      5. HEP: Patient will demonstrate independence with HEP in order to progress toward functional independence.        Long Term Goals:  12 weeks     1. Pain: Pt will demonstrate improved pain by reports of less than or equal to 3/10 worst pain on the verbal rating scale in order to progress toward maximal functional ability and improve QOL.       2. Function: Patient will demonstrate improved function as indicated by a functional limitation score of less than or equal to 48 out of 100 on FOTO.     3. Mobility: Patient will improve AROM to stated goals,  listed in objective measures above, in order to return to maximal functional potential and improve quality of life.     4. Strength: Patient will improve strength to stated goals, listed in objective measures above, in order to improve functional independence and quality of life.     5. Patient will return to normal ADL's, IADL's, community involvement, recreational activities, and work-related activities with less than or equal to 3/10 pain and maximal function.             PLAN   Continue Plan of Care (POC) and progress per patient tolerance.    Evaluation: 10/6/2020  POC Expiration: 1/4/2021    Jenni Rowell, PT, DPT

## 2020-11-05 ENCOUNTER — CLINICAL SUPPORT (OUTPATIENT)
Dept: REHABILITATION | Facility: HOSPITAL | Age: 70
End: 2020-11-05
Payer: MEDICARE

## 2020-11-05 DIAGNOSIS — R29.3 POOR POSTURE: ICD-10-CM

## 2020-11-05 DIAGNOSIS — M62.81 PROXIMAL MUSCLE WEAKNESS: ICD-10-CM

## 2020-11-05 DIAGNOSIS — R29.898 DECREASED RANGE OF MOTION OF NECK: ICD-10-CM

## 2020-11-05 PROCEDURE — 97112 NEUROMUSCULAR REEDUCATION: CPT

## 2020-11-05 PROCEDURE — 97140 MANUAL THERAPY 1/> REGIONS: CPT

## 2020-11-05 PROCEDURE — 97110 THERAPEUTIC EXERCISES: CPT

## 2020-11-05 NOTE — PROGRESS NOTES
Physical Therapy Daily Treatment Note     Name: Bhupinder Bowling Jr.  Clinic Number: [id number]    Therapy Diagnosis:   Encounter Diagnoses   Name Primary?    Decreased range of motion of neck     Proximal muscle weakness     Poor posture      Physician: Leslie Alvarez PA*    Visit Date: 11/5/2020    Physician Orders: PT Eval and Treat  Medical Diagnosis from Referral: cervical spondylosis, cervical facet joint syndrome, myofascial muscle pain   Evaluation Date: 10/6/2020  Authorization Period Expiration: 9/30/2021  Plan of Care Expiration: 1/4/2021  Visit # / Visits authorized: 9 (8 of 20)      Precautions: Standard and hard of hearing     Time In: 10:15 am  Time Out: 11:00 am  Total Billable Time: 45 minutes    SUBJECTIVE     Pt reports: no significant changes since previous session  He was not compliant with home exercise program   Response to previous treatment: decreased tension in musculature following manual therapy.   Functional change: none noted     Pre-Treatment Pain: 5/10  Post-Treatment Pain: 4/10, soreness  Location: neck  TREATMENT     Bhupinder received therapeutic exercises to develop strength, ROM, flexibility and core stabilization for 22 minutes including: (interventions performed today listed in bold)    Exercise    Rope and pulley  3 minutes in each direction  Flexion, abduction   UBE (for scapular strength and endurance)  Level 1, 3 minutes forward, 3 minutes backward    Upper trapezius stretch  1 minute B, performed passively by therapist    Seated cervical rotation  30x B    Seated cervical flexion and extension  30x    Seated cervical side bending  30x B    Shoulder flexion AAROM with dowel  20x seated   Open Books 15x B with manual assist for increased thoracic rotation    Posterior shoulder rolls  30x   Series 6 on airex pad 2 minute pec stretch   1 minute all other motions         Bhupinder participated in neuromuscular re-education activities to improve: Proprioception and Posture for 13  minutes. The following activities were included: (interventions performed today listed in bold)    Exercise    Cervical isometrics 20 x in each direction  Flexion, side bending and retractions    Scapular Retractions Yellow band, 30x   Heavy cueing required for proper exercise form    B Shoulder ER  Yellow band, 3 x 10    Cervical Retraction with Chin Tuck 3 x 10                    x = exercise details same as prior session    Bhupinder received the following manual therapy techniques: Soft tissue Mobilization were applied to the: neck and shoulders for 10 minutes, including:  STM of bilateral suboccipital muscles , cervical paraspinals, upper trapezius, levator scapulae , scalenes , SCM, supraspinatus , infraspinatus  and periscapular musculature   Grade III progressive stretch of 1st rib B      Home Exercises Provided and Patient Education Provided       Written Home Exercises Provided: yes.  Exercises were reviewed and Bhupinder was able to demonstrate them prior to the end of the session.  Bhupinder demonstrated fair  understanding of the education provided.     See EMR under Patient Instructions for exercises provided prior visit.    ASSESSMENT   Pt tolerated manual therapy well with reports of tolerable discomfort during intervention and states decreased tension and increased muscle soreness following intervention. He tolerated all exercises well with reports of tolerable discomfort throughout session. He continues to require cueing to decrease guarding and relax throughout session. Pt tolerated exercises and progressions well with reports of increased fatigue and tolerable discomfort throughout session.     Bhupinder is progressing well towards his goals.   Pt prognosis is Good.     Pt will continue to benefit from skilled outpatient physical therapy to address the deficits listed in the problem list box on initial evaluation, provide pt/family education and to maximize pt's level of independence in the home and community  environment.     Pt's spiritual, cultural and educational needs considered and pt agreeable to plan of care and goals.     Anticipated Barriers for therapy: sedentary lifestyle, lack of understanding of condition and adherence to treatment plan    GOALS:     Short Term Goals:  6 weeks     1. Pain: Pt will demonstrate improved pain by reports of less than or equal to 6/10 worst pain on the verbal rating scale in order to progress toward maximal functional ability and improve QOL.     2. Function: Patient will demonstrate improved function as indicated by a functional limitation score of less than or equal to 56 out of 100 on FOTO.     3. Mobility: Patient will improve AROM to 50% of stated goals, listed in objective measures above, in order to progress towards independence with functional activities.      4. Strength: Patient will improve strength to 50% of stated goals, listed in objective measures above, in order to progress towards independence with functional activities.      5. HEP: Patient will demonstrate independence with HEP in order to progress toward functional independence.        Long Term Goals:  12 weeks     1. Pain: Pt will demonstrate improved pain by reports of less than or equal to 3/10 worst pain on the verbal rating scale in order to progress toward maximal functional ability and improve QOL.       2. Function: Patient will demonstrate improved function as indicated by a functional limitation score of less than or equal to 48 out of 100 on FOTO.     3. Mobility: Patient will improve AROM to stated goals, listed in objective measures above, in order to return to maximal functional potential and improve quality of life.     4. Strength: Patient will improve strength to stated goals, listed in objective measures above, in order to improve functional independence and quality of life.     5. Patient will return to normal ADL's, IADL's, community involvement, recreational activities, and work-related  activities with less than or equal to 3/10 pain and maximal function.             PLAN   Continue Plan of Care (POC) and progress per patient tolerance.    Evaluation: 10/6/2020  POC Expiration: 1/4/2021    Jenni Rowell PT, DPT

## 2020-11-12 ENCOUNTER — CLINICAL SUPPORT (OUTPATIENT)
Dept: REHABILITATION | Facility: HOSPITAL | Age: 70
End: 2020-11-12
Payer: MEDICARE

## 2020-11-12 DIAGNOSIS — R29.3 POOR POSTURE: ICD-10-CM

## 2020-11-12 DIAGNOSIS — R29.898 DECREASED RANGE OF MOTION OF NECK: ICD-10-CM

## 2020-11-12 DIAGNOSIS — M62.81 PROXIMAL MUSCLE WEAKNESS: ICD-10-CM

## 2020-11-12 PROCEDURE — 97110 THERAPEUTIC EXERCISES: CPT

## 2020-11-12 PROCEDURE — 97140 MANUAL THERAPY 1/> REGIONS: CPT

## 2020-11-12 PROCEDURE — 97112 NEUROMUSCULAR REEDUCATION: CPT

## 2020-11-12 NOTE — PROGRESS NOTES
Physical Therapy Daily Treatment Note     Name: Bhupinder Bowling Jr.  Clinic Number: [id number]    Therapy Diagnosis:   Encounter Diagnoses   Name Primary?    Decreased range of motion of neck     Proximal muscle weakness     Poor posture      Physician: Leslie Alvarez PA*    Visit Date: 11/12/2020    Physician Orders: PT Eval and Treat  Medical Diagnosis from Referral: cervical spondylosis, cervical facet joint syndrome, myofascial muscle pain   Evaluation Date: 10/6/2020  Authorization Period Expiration: 9/30/2021  Plan of Care Expiration: 1/4/2021  Visit # / Visits authorized: 10 (9 of 20)      Precautions: Standard and hard of hearing     Time In: 10:10 am  Time Out: 10:55 am  Total Billable Time: 45 minutes    SUBJECTIVE     Pt reports: he was very sore for a few days following his previous session  He was not compliant with home exercise program   Response to previous treatment: increased soreness in neck following previous session  Functional change: none noted     Pre-Treatment Pain: 5/10  Post-Treatment Pain: 4/10, soreness  Location: neck  TREATMENT     Bhupinder received therapeutic exercises to develop strength, ROM, flexibility and core stabilization for 15 minutes including:     Exercise Performed Today    Mitch and javy   3 minutes in each direction  Flexion, abduction   UBE (for scapular strength and endurance)  x Level 1, 4 minutes forward, 4 minutes backward    Upper trapezius stretch   1 minute B, performed passively by therapist    Seated cervical rotation  x 30x B    Seated cervical flexion and extension  x 30x    Seated cervical side bending  x 30x B    Shoulder flexion AAROM with dowel   20x seated   Open Books  15x B with manual assist for increased thoracic rotation    Posterior shoulder rolls   30x   Series 6 on airex pad x 1 minute pec stretch   1 minute all other motions         Bhupinder participated in neuromuscular re-education activities to improve: Proprioception and Posture for 20  minutes. The following activities were included:    Exercise Performed Today    Cervical isometrics x 20 x 2 second holds   Flexion, side bending and retractions    Scapular Retractions x Yellow band, 30x   Heavy cueing required for proper exercise form    B Shoulder ER  x Yellow band, 3 x 10    Cervical Retraction with Chin Tuck x 3 x 10                        x = exercise details same as prior session    Bhupinder received the following manual therapy techniques: Soft tissue Mobilization were applied to the: neck and shoulders for 10 minutes, including:  STM of bilateral suboccipital muscles , cervical paraspinals, upper trapezius, levator scapulae , scalenes , SCM, supraspinatus , infraspinatus  and periscapular musculature   Grade III progressive stretch of 1st rib B      Home Exercises Provided and Patient Education Provided       Written Home Exercises Provided: yes.  Exercises were reviewed and Bhupinder was able to demonstrate them prior to the end of the session.  Bhupinder demonstrated fair  understanding of the education provided.     See EMR under Patient Instructions for exercises provided prior visit.    ASSESSMENT   Pt tolerated manual therapy well with reports of tolerable discomfort during intervention and states decreased tension and increased muscle soreness following intervention. He tolerated all exercises well with reports of tolerable discomfort throughout session. He continues to require cueing to decrease guarding and relax throughout session. Pt tolerated exercises and progressions well with reports of increased fatigue and tolerable discomfort throughout session.     Bhupinder is progressing well towards his goals.   Pt prognosis is Good.     Pt will continue to benefit from skilled outpatient physical therapy to address the deficits listed in the problem list box on initial evaluation, provide pt/family education and to maximize pt's level of independence in the home and community environment.     Pt's  spiritual, cultural and educational needs considered and pt agreeable to plan of care and goals.     Anticipated Barriers for therapy: sedentary lifestyle, lack of understanding of condition and adherence to treatment plan    GOALS:     Short Term Goals:  6 weeks     1. Pain: Pt will demonstrate improved pain by reports of less than or equal to 6/10 worst pain on the verbal rating scale in order to progress toward maximal functional ability and improve QOL.     2. Function: Patient will demonstrate improved function as indicated by a functional limitation score of less than or equal to 56 out of 100 on FOTO.     3. Mobility: Patient will improve AROM to 50% of stated goals, listed in objective measures above, in order to progress towards independence with functional activities.      4. Strength: Patient will improve strength to 50% of stated goals, listed in objective measures above, in order to progress towards independence with functional activities.      5. HEP: Patient will demonstrate independence with HEP in order to progress toward functional independence.        Long Term Goals:  12 weeks     1. Pain: Pt will demonstrate improved pain by reports of less than or equal to 3/10 worst pain on the verbal rating scale in order to progress toward maximal functional ability and improve QOL.       2. Function: Patient will demonstrate improved function as indicated by a functional limitation score of less than or equal to 48 out of 100 on FOTO.     3. Mobility: Patient will improve AROM to stated goals, listed in objective measures above, in order to return to maximal functional potential and improve quality of life.     4. Strength: Patient will improve strength to stated goals, listed in objective measures above, in order to improve functional independence and quality of life.     5. Patient will return to normal ADL's, IADL's, community involvement, recreational activities, and work-related activities with less  than or equal to 3/10 pain and maximal function.             PLAN   Continue Plan of Care (POC) and progress per patient tolerance.    Evaluation: 10/6/2020  POC Expiration: 1/4/2021    Jenni Rowell PT, DPT

## 2020-11-17 ENCOUNTER — OFFICE VISIT (OUTPATIENT)
Dept: FAMILY MEDICINE | Facility: CLINIC | Age: 70
End: 2020-11-17
Payer: MEDICARE

## 2020-11-17 VITALS
BODY MASS INDEX: 23.05 KG/M2 | HEIGHT: 69 IN | HEART RATE: 91 BPM | TEMPERATURE: 99 F | SYSTOLIC BLOOD PRESSURE: 135 MMHG | DIASTOLIC BLOOD PRESSURE: 84 MMHG | WEIGHT: 155.63 LBS

## 2020-11-17 DIAGNOSIS — J06.9 UPPER RESPIRATORY TRACT INFECTION, UNSPECIFIED TYPE: Primary | ICD-10-CM

## 2020-11-17 PROCEDURE — 99203 PR OFFICE/OUTPT VISIT, NEW, LEVL III, 30-44 MIN: ICD-10-PCS | Mod: S$PBB,,, | Performed by: FAMILY MEDICINE

## 2020-11-17 PROCEDURE — 99999 PR PBB SHADOW E&M-EST. PATIENT-LVL III: CPT | Mod: PBBFAC,,, | Performed by: FAMILY MEDICINE

## 2020-11-17 PROCEDURE — 99213 OFFICE O/P EST LOW 20 MIN: CPT | Mod: PBBFAC,PO | Performed by: FAMILY MEDICINE

## 2020-11-17 PROCEDURE — 99203 OFFICE O/P NEW LOW 30 MIN: CPT | Mod: S$PBB,,, | Performed by: FAMILY MEDICINE

## 2020-11-17 PROCEDURE — 99999 PR PBB SHADOW E&M-EST. PATIENT-LVL III: ICD-10-PCS | Mod: PBBFAC,,, | Performed by: FAMILY MEDICINE

## 2020-11-17 RX ORDER — PANTOPRAZOLE SODIUM 40 MG/1
40 TABLET, DELAYED RELEASE ORAL DAILY
Qty: 30 TABLET | Refills: 0 | Status: SHIPPED | OUTPATIENT
Start: 2020-11-17 | End: 2021-11-17

## 2020-11-17 RX ORDER — METHYLPREDNISOLONE 4 MG/1
TABLET ORAL
Qty: 1 PACKAGE | Refills: 0 | Status: SHIPPED | OUTPATIENT
Start: 2020-11-17 | End: 2020-12-08

## 2020-11-17 NOTE — PROGRESS NOTES
Bhupinder Bowling Jr. presents with mild upper respiratory congestion,rhinnorhea, cough past 7days. Had nausea, 1 episode of vomiting 1 w ago ,diarrhea  Fever uo to 100    Past Medical History:   Diagnosis Date    Pre-diabetes      Past Surgical History:   Procedure Laterality Date    BACK SURGERY      THROAT SURGERY       Review of patient's allergies indicates:  No Known Allergies  Current Outpatient Medications on File Prior to Visit   Medication Sig Dispense Refill    amlodipine (NORVASC) 10 MG tablet TK 1 T PO QD  3    celecoxib (CELEBREX) 200 MG capsule TK ONE C PO  BID  3    diazepam (VALIUM) 5 MG tablet Take 5 mg by mouth nightly as needed.  0    gabapentin (NEURONTIN) 100 MG capsule TAKE ONE CAPSULE THREE TIMES DAILY  0    hydrochlorothiazide (MICROZIDE) 12.5 mg capsule TK ONE C PO  QD  3    lisinopril (PRINIVIL,ZESTRIL) 40 MG tablet TK 1 T PO  QD  3    meloxicam (MOBIC) 15 MG tablet Take 15 mg by mouth once daily.  0    metformin (GLUCOPHAGE) 1000 MG tablet TK 1 T PO  BID  3    metoprolol succinate (TOPROL-XL) 100 MG 24 hr tablet TK 1 T PO QD  3    metoprolol tartrate (LOPRESSOR) 50 MG tablet Take 50 mg by mouth 2 (two) times a day.      nifedipine (PROCARDIA-XL) 90 MG (OSM) TR24 TK 1 T PO QD  3    oxyCODONE (OXYCONTIN) 20 mg 12 hr tablet Take 20 mg by mouth.      oxyCODONE (OXYCONTIN) 30 mg TR12 12 hr tablet Take 30 mg by mouth.      oxycodone (ROXICODONE) 15 MG Tab Take 15 mg by mouth 4 (four) times daily as needed.  0    pantoprazole (PROTONIX) 40 MG tablet TK 1 T PO QD  3    tiZANidine (ZANAFLEX) 2 MG tablet Take 2 mg by mouth.       No current facility-administered medications on file prior to visit.      Social History     Socioeconomic History    Marital status:      Spouse name: Not on file    Number of children: Not on file    Years of education: Not on file    Highest education level: Not on file   Occupational History    Not on file   Social Needs    Financial  resource strain: Not on file    Food insecurity     Worry: Not on file     Inability: Not on file    Transportation needs     Medical: Not on file     Non-medical: Not on file   Tobacco Use    Smoking status: Never Smoker   Substance and Sexual Activity    Alcohol use: No    Drug use: Not on file    Sexual activity: Never   Lifestyle    Physical activity     Days per week: Not on file     Minutes per session: Not on file    Stress: Not on file   Relationships    Social connections     Talks on phone: Not on file     Gets together: Not on file     Attends Evangelical service: Not on file     Active member of club or organization: Not on file     Attends meetings of clubs or organizations: Not on file     Relationship status: Not on file   Other Topics Concern    Not on file   Social History Narrative    Not on file     History reviewed. No pertinent family history.      ROS:  SKIN: No rashes, itching or changes in color or texture of skin.  EYES: Visual acuity fine. No photophobia, ocular pain or diplopia.EARS: Denies ear pain, discharge or vertigo.NOSE: No loss of smell, no epistaxis some postnasal drip.MOUTH & THROAT: No hoarseness or change in voice. No excessive gum bleeding.CHEST: Denies SANCHES, cyanosis, wheezing  CARDIOVASCULAR: Denies chest pain, PND, orthopnea or reduced exercise tolerance.  ABDOMEN:  No weight loss.No abdominal pain, no hematemesis or blood in stool.  URINARY: No flank pain, dysuria or hematuria.  PERIPHERAL VASCULAR: No claudication or cyanosis.  MUSCULOSKELETAL: Negative   NEUROLOGIC: No history of seizures, paralysis, alteration of gait or coordination.    PE: Vital signs as noted  Heent:Normocephalic with no recent cranial trauma,PERRLA,EOMI,conjunctiva clear,fundi reveal no hemmorhage exudate or papilledema.Otic canals clear, tympanic membranes slightly dull bilaterally.Nasal mucosa slightly red and edematous.Posterior pharynx slightly red but without exudate.  Neck:Supple with  minimal anterior cervical adenopathy.  Chest:Clear bilateral breath sounds with mild scattered ronchi  Heart:Regular rhthym without murmer  Abdomen:Soft, non tender,no masses, no hepatosplenomegalyExtremeties and Neurologic:Grossly within normal limits  Impression: Upper Respiratory Infection. 465.9  Plan: Medrol dspk  Pantoprazole  Diet recs  Observe and report

## 2020-11-27 ENCOUNTER — TELEPHONE (OUTPATIENT)
Dept: REHABILITATION | Facility: HOSPITAL | Age: 70
End: 2020-11-27

## 2020-11-27 NOTE — TELEPHONE ENCOUNTER
Called patient to discuss cancels and no show visits. I spoke to his wife whom states that the patient has been sick with a cold for the past few weeks but that he plans to attend his upcoming sessions     Jenni Rowell PT, DPT   11/27/2020

## 2020-12-01 ENCOUNTER — CLINICAL SUPPORT (OUTPATIENT)
Dept: REHABILITATION | Facility: HOSPITAL | Age: 70
End: 2020-12-01
Payer: MEDICARE

## 2020-12-01 DIAGNOSIS — R29.898 DECREASED RANGE OF MOTION OF NECK: ICD-10-CM

## 2020-12-01 DIAGNOSIS — M62.81 PROXIMAL MUSCLE WEAKNESS: ICD-10-CM

## 2020-12-01 DIAGNOSIS — R29.3 POOR POSTURE: ICD-10-CM

## 2020-12-01 PROCEDURE — 97110 THERAPEUTIC EXERCISES: CPT

## 2020-12-01 PROCEDURE — 97140 MANUAL THERAPY 1/> REGIONS: CPT

## 2020-12-01 PROCEDURE — 97112 NEUROMUSCULAR REEDUCATION: CPT

## 2020-12-01 NOTE — PROGRESS NOTES
Physical Therapy Progress Note     Name: Bhupinder Bowling Jr.  Clinic Number: [id number]    Therapy Diagnosis:   Encounter Diagnoses   Name Primary?    Decreased range of motion of neck     Proximal muscle weakness     Poor posture      Physician: Leslie Alvarez PA*    Visit Date: 12/1/2020    Physician Orders: PT Eval and Treat  Medical Diagnosis from Referral: cervical spondylosis, cervical facet joint syndrome, myofascial muscle pain    Evaluation Date: 10/6/2020  Authorization Period Expiration: 9/30/2021  Plan of Care Expiration: 1/4/2021  Visit # / Visits authorized: 11 (10 of 20)      Precautions: Standard and hard of hearing     Time In: 9:50 am  Time Out: 10:40 am  Total Billable Time: 50 minutes    SUBJECTIVE     Pt reports: he missed/cancelled his last few appointments due to being sick. States no significant changes in neck pain and stiffness   He was not compliant with home exercise program   Response to previous treatment: increased soreness in neck following previous session  Functional change: none noted     Pre-Treatment Pain: 4/10  Post-Treatment Pain: 4/10, soreness  Location: neck    OBJECTIVE   (x = not tested due to pain and/or inability to obtain test position)     RANGE OF MOTION:     Cervical Right   (spine) Left     Pain/Dysfunction with Movement Goal   Cervical Flexion (60) 50 --- Suboccipital and posterior cervical pain      Cervical Extension (90) 15 --- Suboccipital and posterior cervical pain      Cervical Side Bending (45) 8 12 lateral neck pain with movement B     Cervical Rotation (75) 8 5 lateral neck pain with movement B        Shoulder AROM/PROM Right Left Pain/Dysfunction with Movement Goal   Shoulder Flexion (180) 145/170 145/170 Pain in B neck and shoulders with AROM     Shoulder Extension (60) 50/50 50/50       Shoulder Abduction (180) 145/170 145/170 Pain in B neck and shoulders with AROM     Shoulder ER (90) T2 T2 Pain in B neck and shoulders with AROM     Shoulder IR  (70) L1 L1          STRENGTH:     U/E MMT Right Left Pain/Dysfunction with Movement Goal   Shoulder Flexion 4-/5 4-/5   4/5 B   Shoulder Extension 4/5 4/5   4/5 B   Shoulder Abduction 4-/5 4-/5   4/5 B   Shoulder IR 4/5 4/5   4/5 B   Shoulder ER    4-/5 4-/5   4/5 B   Elbow Flexion  4/5 4/5   4+/5 B   Elbow Extension 4/5 4/5   4+/5 B         MUSCLE LENGTH:      Muscle Tested  Right Left  Goal   Upper Trapezius  decreased decreased     Levator Scapulae  decreased decreased     Sternocleidomastoid decreased decreased     Scalenes  decreased decreased        Palpation: Increased tone and tenderness noted with palpation of bilateral suboccipital muscles , cervical paraspinals, upper trapezius, levator scapulae , scalenes , SCM, supraspinatus , infraspinatus , teres major and minor  and periscapular musculature. Increased tenderness noted with palpation of  cervical spinous processes.      Posture:  Pt presents with postural abnormalities which include: forward head and rounded shoulders     TREATMENT     Bhupinder received therapeutic exercises to develop strength, ROM, flexibility and core stabilization for 15 minutes including:     Exercise Performed Today    Rope and javy   3 minutes in each direction  Flexion, abduction   UBE (for scapular strength and endurance)  x Level 1, 4 minutes forward, 4 minutes backward    Upper trapezius stretch   1 minute B, performed passively by therapist    Seated cervical rotation  x 30x B    Seated cervical flexion and extension  x 30x    Seated cervical side bending  x 30x B    Shoulder flexion AAROM with dowel   20x seated   Open Books  15x B with manual assist for increased thoracic rotation    Posterior shoulder rolls   30x   Series 6 on airex pad x 1 minute pec stretch   1 minute all other motions         Bhupinder participated in neuromuscular re-education activities to improve: Proprioception and Posture for 20 minutes. The following activities were included:    Exercise Performed  Today    Cervical isometrics x 20 x 2 second holds   Flexion, side bending and retractions    Scapular Retractions x Yellow band, 30x   Heavy cueing required for proper exercise form    B Shoulder ER  x Yellow band, 3 x 10    Cervical Retraction with Chin Tuck x 3 x 10                        x = exercise details same as prior session    Bhupinder received the following manual therapy techniques: Soft tissue Mobilization were applied to the: neck and shoulders for 15 minutes, including:  STM of bilateral suboccipital muscles , cervical paraspinals, upper trapezius, levator scapulae , scalenes , SCM, supraspinatus , infraspinatus  and periscapular musculature   Grade III progressive stretch of 1st rib B      Home Exercises Provided and Patient Education Provided       Written Home Exercises Provided: yes.  Exercises were reviewed and Bhupinder was able to demonstrate them prior to the end of the session.  Bhupinder demonstrated fair  understanding of the education provided.     See EMR under Patient Instructions for exercises provided prior visit.    ASSESSMENT   Pt tolerated manual therapy well with reports of tolerable discomfort during intervention and states decreased tension and increased muscle soreness following intervention. He tolerated all exercises well with reports of tolerable discomfort throughout session. He continues to require cueing to decrease guarding and relax throughout session. Pt tolerated exercises and progressions well with reports of increased fatigue and tolerable discomfort throughout session. A progress note was performed today. It is noted that the patient has no significant changes in overall range of motion but does demonstrate improved strength B. He reports significant improvement in symptoms since starting physical therapy but notes no significant changes recently. This can be attributed to patient having to miss several session due to being sick. He reports some improvement in overall function  but continues to have difficulty turning his head to look behind him. Tolerance for exercise has improved significantly. Patient will continues to benefit from physical therapy in order to further address goals, maximize function and QOL.     Bhupinder is progressing well towards his goals.   Pt prognosis is Good.     Pt will continue to benefit from skilled outpatient physical therapy to address the deficits listed in the problem list box on initial evaluation, provide pt/family education and to maximize pt's level of independence in the home and community environment.     Pt's spiritual, cultural and educational needs considered and pt agreeable to plan of care and goals.     Anticipated Barriers for therapy: sedentary lifestyle, lack of understanding of condition and adherence to treatment plan    GOALS:     Short Term Goals:  6 weeks     1. Pain: Pt will demonstrate improved pain by reports of less than or equal to 6/10 worst pain on the verbal rating scale in order to progress toward maximal functional ability and improve QOL.     2. Function: Patient will demonstrate improved function as indicated by a functional limitation score of less than or equal to 56 out of 100 on FOTO.     3. Mobility: Patient will improve AROM to 50% of stated goals, listed in objective measures above, in order to progress towards independence with functional activities.      4. Strength: Patient will improve strength to 50% of stated goals, listed in objective measures above, in order to progress towards independence with functional activities.      5. HEP: Patient will demonstrate independence with HEP in order to progress toward functional independence.        Long Term Goals:  12 weeks     1. Pain: Pt will demonstrate improved pain by reports of less than or equal to 3/10 worst pain on the verbal rating scale in order to progress toward maximal functional ability and improve QOL.       2. Function: Patient will demonstrate improved  function as indicated by a functional limitation score of less than or equal to 48 out of 100 on FOTO.     3. Mobility: Patient will improve AROM to stated goals, listed in objective measures above, in order to return to maximal functional potential and improve quality of life.     4. Strength: Patient will improve strength to stated goals, listed in objective measures above, in order to improve functional independence and quality of life.     5. Patient will return to normal ADL's, IADL's, community involvement, recreational activities, and work-related activities with less than or equal to 3/10 pain and maximal function.             PLAN   Continue Plan of Care (POC) and progress per patient tolerance.    Evaluation: 10/6/2020  Progress Note: 12/1/2020  POC Expiration: 1/4/2021    Jenni Rowell PT, DPT

## 2020-12-03 ENCOUNTER — CLINICAL SUPPORT (OUTPATIENT)
Dept: REHABILITATION | Facility: HOSPITAL | Age: 70
End: 2020-12-03
Payer: MEDICARE

## 2020-12-03 DIAGNOSIS — R29.898 DECREASED RANGE OF MOTION OF NECK: ICD-10-CM

## 2020-12-03 DIAGNOSIS — R29.3 POOR POSTURE: ICD-10-CM

## 2020-12-03 DIAGNOSIS — M62.81 PROXIMAL MUSCLE WEAKNESS: ICD-10-CM

## 2020-12-03 PROCEDURE — 97112 NEUROMUSCULAR REEDUCATION: CPT

## 2020-12-03 PROCEDURE — 97140 MANUAL THERAPY 1/> REGIONS: CPT

## 2020-12-03 PROCEDURE — 97110 THERAPEUTIC EXERCISES: CPT

## 2020-12-03 NOTE — PROGRESS NOTES
Physical Therapy Daily Treatment Note     Name: Bhupinder Bowling Jr.  Clinic Number: [id number]     Therapy Diagnosis:   Encounter Diagnoses   Name Primary?    Decreased range of motion of neck     Proximal muscle weakness     Poor posture      Physician: Leslie Alvarez PA*    Visit Date: 12/3/2020    Physician Orders: PT Eval and Treat  Medical Diagnosis from Referral: cervical spondylosis, cervical facet joint syndrome, myofascial muscle pain    Evaluation Date: 10/6/2020  Authorization Period Expiration: 9/30/2021  Plan of Care Expiration: 1/4/2021  Visit # / Visits authorized: 12 (11 of 20)      Precautions: Standard and hard of hearing     Time In: 10:15 am  Time Out: 11:00 am  Total Billable Time: 45 minutes    SUBJECTIVE     Pt reports: no significant changes since previous session. States he continues to sleep in his cervical collar due to pain at night   He was not compliant with home exercise program   Response to previous treatment: increased soreness in neck following previous session  Functional change: none noted     Pre-Treatment Pain: 4/10  Post-Treatment Pain: 4/10, soreness  Location: neck      TREATMENT     Bhupinder received therapeutic exercises to develop strength, ROM, flexibility and core stabilization for 15 minutes including:     Exercise Performed Today    Mitch and javy   3 minutes in each direction  Flexion, abduction   UBE (for scapular strength and endurance)  x Level 1, 4 minutes forward, 4 minutes backward    Upper trapezius stretch   1 minute B, performed passively by therapist    Seated cervical rotation  x 30x B    Seated cervical flexion and extension  x 30x    Seated cervical side bending  x 30x B    Shoulder flexion AAROM with dowel   20x seated   Open Books  15x B with manual assist for increased thoracic rotation    Posterior shoulder rolls   30x   Series 6 on half foam x 1 minute pec stretch   1 minute all other motions         Bhupinder participated in neuromuscular  re-education activities to improve: Proprioception and Posture for 20 minutes. The following activities were included:    Exercise Performed Today    Cervical rotation with ball  x 20 x 2 second holds   Flexion, side bending and retractions    Scapular Retractions x Yellow band, 30x   Heavy cueing required for proper exercise form    B Shoulder ER  x Yellow band, 3 x 10    Cervical Retraction with Chin Tuck x 3 x 10                        x = exercise details same as prior session    Bhupinder received the following manual therapy techniques: Soft tissue Mobilization were applied to the: neck and shoulders for 10 minutes, including:  STM of bilateral suboccipital muscles , cervical paraspinals, upper trapezius, levator scapulae , scalenes  and periscapular musculature   Grade III progressive stretch of 1st rib B  Suboccipital release   Forward nod and subcranial side bending mobilization    Home Exercises Provided and Patient Education Provided       Written Home Exercises Provided: yes.  Exercises were reviewed and Bhupinder was able to demonstrate them prior to the end of the session.  Bhupinder demonstrated fair  understanding of the education provided.     See EMR under Patient Instructions for exercises provided prior visit.    ASSESSMENT   Pt tolerated manual therapy well with reports of tolerable discomfort during intervention and states decreased tension and increased muscle soreness following intervention. He tolerated all exercises well with reports of tolerable discomfort throughout session. Significant cueing required for proper exercise form which is mostly related to impaired hearing. He continues to require cueing to decrease guarding and relax throughout session. Pt tolerated exercises and progressions well with reports of increased fatigue and tolerable discomfort throughout session.     Bhupinder is progressing well towards his goals.   Pt prognosis is Good.     Pt will continue to benefit from skilled outpatient  physical therapy to address the deficits listed in the problem list box on initial evaluation, provide pt/family education and to maximize pt's level of independence in the home and community environment.     Pt's spiritual, cultural and educational needs considered and pt agreeable to plan of care and goals.     Anticipated Barriers for therapy: sedentary lifestyle, lack of understanding of condition and adherence to treatment plan    GOALS:     Short Term Goals:  6 weeks     1. Pain: Pt will demonstrate improved pain by reports of less than or equal to 6/10 worst pain on the verbal rating scale in order to progress toward maximal functional ability and improve QOL.     2. Function: Patient will demonstrate improved function as indicated by a functional limitation score of less than or equal to 56 out of 100 on FOTO.     3. Mobility: Patient will improve AROM to 50% of stated goals, listed in objective measures above, in order to progress towards independence with functional activities.      4. Strength: Patient will improve strength to 50% of stated goals, listed in objective measures above, in order to progress towards independence with functional activities.      5. HEP: Patient will demonstrate independence with HEP in order to progress toward functional independence.        Long Term Goals:  12 weeks     1. Pain: Pt will demonstrate improved pain by reports of less than or equal to 3/10 worst pain on the verbal rating scale in order to progress toward maximal functional ability and improve QOL.       2. Function: Patient will demonstrate improved function as indicated by a functional limitation score of less than or equal to 48 out of 100 on FOTO.     3. Mobility: Patient will improve AROM to stated goals, listed in objective measures above, in order to return to maximal functional potential and improve quality of life.     4. Strength: Patient will improve strength to stated goals, listed in objective  measures above, in order to improve functional independence and quality of life.     5. Patient will return to normal ADL's, IADL's, community involvement, recreational activities, and work-related activities with less than or equal to 3/10 pain and maximal function.             PLAN   Continue Plan of Care (POC) and progress per patient tolerance.    Evaluation: 10/6/2020  Progress Note: 12/1/2020  POC Expiration: 1/4/2021    Jenni Rowell PT, DPT

## 2020-12-08 ENCOUNTER — CLINICAL SUPPORT (OUTPATIENT)
Dept: REHABILITATION | Facility: HOSPITAL | Age: 70
End: 2020-12-08
Payer: MEDICARE

## 2020-12-08 DIAGNOSIS — M62.81 PROXIMAL MUSCLE WEAKNESS: ICD-10-CM

## 2020-12-08 DIAGNOSIS — R29.3 POOR POSTURE: ICD-10-CM

## 2020-12-08 DIAGNOSIS — R29.898 DECREASED RANGE OF MOTION OF NECK: ICD-10-CM

## 2020-12-08 PROCEDURE — 97140 MANUAL THERAPY 1/> REGIONS: CPT

## 2020-12-08 PROCEDURE — 97112 NEUROMUSCULAR REEDUCATION: CPT

## 2020-12-08 PROCEDURE — 97110 THERAPEUTIC EXERCISES: CPT

## 2020-12-08 NOTE — PROGRESS NOTES
Physical Therapy Progress Note     Name: Bhupinder Bowling Jr.  Clinic Number: [id number]    Therapy Diagnosis:   Encounter Diagnoses   Name Primary?    Decreased range of motion of neck     Proximal muscle weakness     Poor posture      Physician: Leslie Alvarez PA*    Visit Date: 12/8/2020    Physician Orders: PT Eval and Treat  Medical Diagnosis from Referral: cervical spondylosis, cervical facet joint syndrome, myofascial muscle pain    Evaluation Date: 10/6/2020  Authorization Period Expiration: 9/30/2021  Plan of Care Expiration: 1/4/2021  Visit # / Visits authorized: 13 (12 of 20)      Precautions: Standard and hard of hearing     Time In: 9:55 am  Time Out: 10:50 am  Total Billable Time: 55 minutes    SUBJECTIVE     Pt reports: no significant changes noted since previous session   He was not compliant with home exercise program   Response to previous treatment: increased soreness in neck following previous session  Functional change: none noted     Pre-Treatment Pain: 4/10  Post-Treatment Pain: 4/10, soreness  Location: neck      TREATMENT     Bhupinder received therapeutic exercises to develop strength, ROM, flexibility and core stabilization for 25 minutes including:     Exercise Performed Today    Mitch and javy   3 minutes in each direction  Flexion, abduction   UBE (for scapular strength and endurance)  x Level 2, 3 minutes forward, 3 minutes backward    Upper trapezius stretch   1 minute B, performed passively by therapist    Seated cervical rotation  x 30x B    Seated cervical flexion and extension  x 30x    Seated cervical side bending  x 30x B    Shoulder flexion AAROM with dowel   20x seated   Open Books  15x B with manual assist for increased thoracic rotation    Posterior shoulder rolls   30x   Series 6 on half foam x 1 minute pec stretch   1 minute all other motions   Shoulder shrugs and depressions  x 30x each B    Field goals  x 3 x 10          Bhupinder participated in neuromuscular re-education  activities to improve: Proprioception and Posture for 20 minutes. The following activities were included:    Exercise Performed Today    Cervical rotation with ball  x 30x B    Scapular Retractions x 30#, 3 x 10   Heavy cueing required for proper exercise form    B Shoulder ER  x Red band, 3 x 10    Cervical Retraction with Chin Tuck x Yellow band, 3 x 10                        x = exercise details same as prior session    Bhupinder received the following manual therapy techniques: Soft tissue Mobilization were applied to the: neck and shoulders for 10 minutes, including:  STM of bilateral suboccipital muscles , cervical paraspinals, upper trapezius, levator scapulae , scalenes , SCM, supraspinatus , infraspinatus  and periscapular musculature   Grade III progressive stretch of 1st rib B  Suboccipital release   Forward nod and subcranial side bending mobilization     Home Exercises Provided and Patient Education Provided       Written Home Exercises Provided: yes.  Exercises were reviewed and Bhupinder was able to demonstrate them prior to the end of the session.  Bhupinder demonstrated fair  understanding of the education provided.     See EMR under Patient Instructions for exercises provided prior visit.    ASSESSMENT   Pt tolerated manual therapy well with reports of tolerable discomfort during intervention and states decreased tension and increased muscle soreness following intervention. He tolerated all exercises and progressions well with reports of tolerable discomfort throughout session. Significant cueing required for new exercises and proper form with current exercises due to impaired hearing. He continues to require cueing to decrease guarding and relax throughout session. Patient left session with reports of being sore and fatigued but states that he feels okay otherwise.     Bhupinder is progressing well towards his goals.   Pt prognosis is Good.     Pt will continue to benefit from skilled outpatient physical therapy  to address the deficits listed in the problem list box on initial evaluation, provide pt/family education and to maximize pt's level of independence in the home and community environment.     Pt's spiritual, cultural and educational needs considered and pt agreeable to plan of care and goals.     Anticipated Barriers for therapy: sedentary lifestyle, lack of understanding of condition and adherence to treatment plan    GOALS:     Short Term Goals:  6 weeks     1. Pain: Pt will demonstrate improved pain by reports of less than or equal to 6/10 worst pain on the verbal rating scale in order to progress toward maximal functional ability and improve QOL.     2. Function: Patient will demonstrate improved function as indicated by a functional limitation score of less than or equal to 56 out of 100 on FOTO.     3. Mobility: Patient will improve AROM to 50% of stated goals, listed in objective measures above, in order to progress towards independence with functional activities.      4. Strength: Patient will improve strength to 50% of stated goals, listed in objective measures above, in order to progress towards independence with functional activities.      5. HEP: Patient will demonstrate independence with HEP in order to progress toward functional independence.        Long Term Goals:  12 weeks     1. Pain: Pt will demonstrate improved pain by reports of less than or equal to 3/10 worst pain on the verbal rating scale in order to progress toward maximal functional ability and improve QOL.       2. Function: Patient will demonstrate improved function as indicated by a functional limitation score of less than or equal to 48 out of 100 on FOTO.     3. Mobility: Patient will improve AROM to stated goals, listed in objective measures above, in order to return to maximal functional potential and improve quality of life.     4. Strength: Patient will improve strength to stated goals, listed in objective measures above, in order  to improve functional independence and quality of life.     5. Patient will return to normal ADL's, IADL's, community involvement, recreational activities, and work-related activities with less than or equal to 3/10 pain and maximal function.             PLAN   Continue Plan of Care (POC) and progress per patient tolerance.    Evaluation: 10/6/2020  Progress Note: 12/1/2020  POC Expiration: 1/4/2021    Jenni Rowell PT, DPT

## 2020-12-10 ENCOUNTER — CLINICAL SUPPORT (OUTPATIENT)
Dept: REHABILITATION | Facility: HOSPITAL | Age: 70
End: 2020-12-10
Payer: MEDICARE

## 2020-12-10 ENCOUNTER — TELEPHONE (OUTPATIENT)
Dept: PAIN MEDICINE | Facility: CLINIC | Age: 70
End: 2020-12-10

## 2020-12-10 ENCOUNTER — OFFICE VISIT (OUTPATIENT)
Dept: PAIN MEDICINE | Facility: CLINIC | Age: 70
End: 2020-12-10
Payer: MEDICARE

## 2020-12-10 VITALS
SYSTOLIC BLOOD PRESSURE: 171 MMHG | HEIGHT: 69 IN | BODY MASS INDEX: 22.92 KG/M2 | WEIGHT: 154.75 LBS | HEART RATE: 77 BPM | DIASTOLIC BLOOD PRESSURE: 84 MMHG

## 2020-12-10 DIAGNOSIS — M47.812 CERVICAL FACET JOINT SYNDROME: ICD-10-CM

## 2020-12-10 DIAGNOSIS — R29.3 POOR POSTURE: ICD-10-CM

## 2020-12-10 DIAGNOSIS — M47.812 CERVICAL SPONDYLOSIS: Primary | ICD-10-CM

## 2020-12-10 DIAGNOSIS — M79.18 MYOFASCIAL MUSCLE PAIN: ICD-10-CM

## 2020-12-10 DIAGNOSIS — Z98.1 S/P CERVICAL SPINAL FUSION: ICD-10-CM

## 2020-12-10 DIAGNOSIS — R29.898 DECREASED RANGE OF MOTION OF NECK: ICD-10-CM

## 2020-12-10 DIAGNOSIS — M62.81 PROXIMAL MUSCLE WEAKNESS: ICD-10-CM

## 2020-12-10 PROCEDURE — 99999 PR PBB SHADOW E&M-EST. PATIENT-LVL IV: CPT | Mod: PBBFAC,,, | Performed by: ANESTHESIOLOGY

## 2020-12-10 PROCEDURE — 99999 PR PBB SHADOW E&M-EST. PATIENT-LVL IV: ICD-10-PCS | Mod: PBBFAC,,, | Performed by: ANESTHESIOLOGY

## 2020-12-10 PROCEDURE — 97140 MANUAL THERAPY 1/> REGIONS: CPT

## 2020-12-10 PROCEDURE — 99214 OFFICE O/P EST MOD 30 MIN: CPT | Mod: PBBFAC | Performed by: ANESTHESIOLOGY

## 2020-12-10 PROCEDURE — 99213 PR OFFICE/OUTPT VISIT, EST, LEVL III, 20-29 MIN: ICD-10-PCS | Mod: S$PBB,,, | Performed by: ANESTHESIOLOGY

## 2020-12-10 PROCEDURE — 97112 NEUROMUSCULAR REEDUCATION: CPT

## 2020-12-10 PROCEDURE — 97110 THERAPEUTIC EXERCISES: CPT

## 2020-12-10 PROCEDURE — 99213 OFFICE O/P EST LOW 20 MIN: CPT | Mod: S$PBB,,, | Performed by: ANESTHESIOLOGY

## 2020-12-10 RX ORDER — METOPROLOL TARTRATE 25 MG/1
TABLET, FILM COATED ORAL
COMMUNITY
Start: 2020-12-07

## 2020-12-10 RX ORDER — FLUTICASONE PROPIONATE 50 MCG
SPRAY, SUSPENSION (ML) NASAL
COMMUNITY
Start: 2020-12-01

## 2020-12-10 NOTE — TELEPHONE ENCOUNTER
----- Message from Mira Crouch sent at 12/10/2020 12:26 PM CST -----  Regarding: appt  Contact: pt  Pt wife called to reschedule todays  appt please contact pt    Pt can be reached at [phone]

## 2020-12-10 NOTE — PROGRESS NOTES
Physical Therapy Progress Note     Name: Bhupinder Bowling Jr.  Clinic Number: [id number]    Therapy Diagnosis:   Encounter Diagnoses   Name Primary?    Decreased range of motion of neck     Proximal muscle weakness     Poor posture      Physician: Leslie Alvarez PA*    Visit Date: 12/10/2020    Physician Orders: PT Eval and Treat  Medical Diagnosis from Referral: cervical spondylosis, cervical facet joint syndrome, myofascial muscle pain    Evaluation Date: 10/6/2020  Authorization Period Expiration: 9/30/2021  Plan of Care Expiration: 1/4/2021  Visit # / Visits authorized: 14 (13 of 20)      Precautions: Standard and hard of hearing     Time In: 10:15 am  Time Out: 11:00 am  Total Billable Time: 45 minutes    SUBJECTIVE     Pt reports: no significant changes noted since previous session   He was not compliant with home exercise program   Response to previous treatment: increased soreness in neck following previous session  Functional change: none noted     Pre-Treatment Pain: 4/10  Post-Treatment Pain: 4/10, soreness  Location: neck      TREATMENT     Bhupinder received therapeutic exercises to develop strength, ROM, flexibility and core stabilization for 20 minutes including:     Exercise Performed Today    Mitch and javy   3 minutes in each direction  Flexion, abduction   UBE (for scapular strength and endurance)  x Level 2, 3 minutes forward, 3 minutes backward    Upper trapezius stretch   1 minute B, performed passively by therapist    Seated cervical rotation  x 30x B    Seated cervical flexion and extension  x 30x    Seated cervical side bending   30x B    Shoulder flexion AAROM with dowel   20x seated   Open Books  15x B with manual assist for increased thoracic rotation    Posterior shoulder rolls   30x   Series 6 on half foam  1 minute pec stretch   1 minute all other motions   Shoulder shrugs and depressions   30x each B    Field goals  x 3 x 10    Shoulder extensions  x 20#, 3 x 10 B          Bhupinder  participated in neuromuscular re-education activities to improve: Proprioception and Posture for 12 minutes. The following activities were included:    Exercise Performed Today    Cervical rotation with ball  x 30x B    Scapular Retractions x 30#, 3 x 10   Heavy cueing required for proper exercise form    B Shoulder ER   Red band, 3 x 10    Cervical Retraction with Chin Tuck x Yellow band, 3 x 10                        x = exercise details same as prior session    Bhupinder received the following manual therapy techniques: Soft tissue Mobilization were applied to the: neck and shoulders for 13 minutes, including:  STM of bilateral suboccipital muscles , cervical paraspinals, scalenes , SCM and periscapular musculature   Suboccipital release   Forward nod and subcranial side bending mobilization     Home Exercises Provided and Patient Education Provided       Written Home Exercises Provided: yes.  Exercises were reviewed and Bhupinder was able to demonstrate them prior to the end of the session.  Bhupinder demonstrated fair  understanding of the education provided.     See EMR under Patient Instructions for exercises provided prior visit.    ASSESSMENT   Pt tolerated manual therapy well with reports of tolerable discomfort during intervention and states decreased tension and increased muscle soreness following intervention. He tolerated all exercises and progressions well with reports of tolerable discomfort throughout session. Significant cueing required for new exercises and proper form with current exercises due to impaired hearing. He continues to require cueing to decrease guarding and relax throughout session. Patient left session with reports of being sore and fatigued but states that he feels okay otherwise.     Bhupinder is progressing well towards his goals.   Pt prognosis is Good.     Pt will continue to benefit from skilled outpatient physical therapy to address the deficits listed in the problem list box on initial  evaluation, provide pt/family education and to maximize pt's level of independence in the home and community environment.     Pt's spiritual, cultural and educational needs considered and pt agreeable to plan of care and goals.     Anticipated Barriers for therapy: sedentary lifestyle, lack of understanding of condition and adherence to treatment plan    GOALS:     Short Term Goals:  6 weeks     1. Pain: Pt will demonstrate improved pain by reports of less than or equal to 6/10 worst pain on the verbal rating scale in order to progress toward maximal functional ability and improve QOL.     2. Function: Patient will demonstrate improved function as indicated by a functional limitation score of less than or equal to 56 out of 100 on FOTO.     3. Mobility: Patient will improve AROM to 50% of stated goals, listed in objective measures above, in order to progress towards independence with functional activities.      4. Strength: Patient will improve strength to 50% of stated goals, listed in objective measures above, in order to progress towards independence with functional activities.      5. HEP: Patient will demonstrate independence with HEP in order to progress toward functional independence.        Long Term Goals:  12 weeks     1. Pain: Pt will demonstrate improved pain by reports of less than or equal to 3/10 worst pain on the verbal rating scale in order to progress toward maximal functional ability and improve QOL.       2. Function: Patient will demonstrate improved function as indicated by a functional limitation score of less than or equal to 48 out of 100 on FOTO.     3. Mobility: Patient will improve AROM to stated goals, listed in objective measures above, in order to return to maximal functional potential and improve quality of life.     4. Strength: Patient will improve strength to stated goals, listed in objective measures above, in order to improve functional independence and quality of life.      5. Patient will return to normal ADL's, IADL's, community involvement, recreational activities, and work-related activities with less than or equal to 3/10 pain and maximal function.             PLAN   Continue Plan of Care (POC) and progress per patient tolerance.    Evaluation: 10/6/2020  Progress Note: 12/1/2020  POC Expiration: 1/4/2021    Jenni Rowell PT, DPT

## 2020-12-11 ENCOUNTER — TELEPHONE (OUTPATIENT)
Dept: ORTHOPEDICS | Facility: CLINIC | Age: 70
End: 2020-12-11

## 2020-12-14 ENCOUNTER — TELEPHONE (OUTPATIENT)
Dept: ORTHOPEDICS | Facility: CLINIC | Age: 70
End: 2020-12-14

## 2020-12-15 ENCOUNTER — OFFICE VISIT (OUTPATIENT)
Dept: NEUROSURGERY | Facility: CLINIC | Age: 70
End: 2020-12-15
Payer: MEDICARE

## 2020-12-15 VITALS
HEIGHT: 69 IN | HEART RATE: 90 BPM | DIASTOLIC BLOOD PRESSURE: 83 MMHG | WEIGHT: 156.06 LBS | RESPIRATION RATE: 18 BRPM | BODY MASS INDEX: 23.12 KG/M2 | SYSTOLIC BLOOD PRESSURE: 158 MMHG

## 2020-12-15 DIAGNOSIS — M47.892 OTHER SPONDYLOSIS, CERVICAL REGION: ICD-10-CM

## 2020-12-15 DIAGNOSIS — M54.12 RADICULOPATHY, CERVICAL REGION: ICD-10-CM

## 2020-12-15 DIAGNOSIS — Z98.1 HISTORY OF FUSION OF CERVICAL SPINE: Primary | ICD-10-CM

## 2020-12-15 DIAGNOSIS — M47.812 CERVICAL SPONDYLOSIS: ICD-10-CM

## 2020-12-15 PROCEDURE — 99215 OFFICE O/P EST HI 40 MIN: CPT | Mod: PBBFAC | Performed by: PHYSICIAN ASSISTANT

## 2020-12-15 PROCEDURE — 99999 PR PBB SHADOW E&M-EST. PATIENT-LVL V: CPT | Mod: PBBFAC,,, | Performed by: PHYSICIAN ASSISTANT

## 2020-12-15 PROCEDURE — 99203 OFFICE O/P NEW LOW 30 MIN: CPT | Mod: S$PBB,,, | Performed by: PHYSICIAN ASSISTANT

## 2020-12-15 PROCEDURE — 99999 PR PBB SHADOW E&M-EST. PATIENT-LVL V: ICD-10-PCS | Mod: PBBFAC,,, | Performed by: PHYSICIAN ASSISTANT

## 2020-12-15 PROCEDURE — 99203 PR OFFICE/OUTPT VISIT, NEW, LEVL III, 30-44 MIN: ICD-10-PCS | Mod: S$PBB,,, | Performed by: PHYSICIAN ASSISTANT

## 2020-12-15 RX ORDER — DULOXETIN HYDROCHLORIDE 60 MG/1
CAPSULE, DELAYED RELEASE ORAL
COMMUNITY
Start: 2020-12-11

## 2020-12-15 RX ORDER — OXYCODONE HYDROCHLORIDE 20 MG/1
TABLET ORAL
COMMUNITY
Start: 2020-12-11

## 2020-12-15 RX ORDER — GABAPENTIN 600 MG/1
TABLET ORAL
COMMUNITY
Start: 2020-12-11

## 2020-12-15 RX ORDER — OXYCODONE HYDROCHLORIDE 60 MG/1
TABLET, FILM COATED, EXTENDED RELEASE ORAL
COMMUNITY
Start: 2020-12-11
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
## 2020-12-15 NOTE — LETTER
December 15, 2020      Octavio Bazan MD  [address]  [place] [address]           [facility]  [address]  [place] [address]  Phone: [phone]  Fax: [phone]          Patient: Bhupinder Bowling Jr.   MR Number: [id number]   YOB: 1950   Date of Visit: 12/15/2020       Dear Dr. Octavio Bazan:    Thank you for referring Bhupinder Bowling to me for evaluation. Attached you will find relevant portions of my assessment and plan of care.    If you have questions, please do not hesitate to call me. I look forward to following Bhupinder Bowling along with you.    Sincerely,    Kings Cordero PA-C    Enclosure  CC:  No Recipients    If you would like to receive this communication electronically, please contact [email or web address] or [phone] to request more information on [facility] access.    For providers and/or their staff who would like to refer a patient to [facility], please contact us through our one-stop-shop provider referral line, [facility], at [phone].    If you feel you have received this communication in error or would no longer like to receive these types of communications, please e-mail [email or web address]

## 2020-12-16 ENCOUNTER — LAB VISIT (OUTPATIENT)
Dept: LAB | Facility: HOSPITAL | Age: 70
End: 2020-12-16
Attending: PHYSICIAN ASSISTANT
Payer: MEDICARE

## 2020-12-16 DIAGNOSIS — M54.12 RADICULOPATHY, CERVICAL REGION: ICD-10-CM

## 2020-12-16 DIAGNOSIS — Z98.1 HISTORY OF FUSION OF CERVICAL SPINE: ICD-10-CM

## 2020-12-16 DIAGNOSIS — M47.812 CERVICAL SPONDYLOSIS: ICD-10-CM

## 2020-12-16 LAB
CREAT SERPL-MCNC: 1 MG/DL (ref 0.5–1.4)
EST. GFR  (AFRICAN AMERICAN): >60 ML/MIN/1.73 M^2
EST. GFR  (NON AFRICAN AMERICAN): >60 ML/MIN/1.73 M^2

## 2020-12-16 PROCEDURE — 36415 COLL VENOUS BLD VENIPUNCTURE: CPT

## 2020-12-16 PROCEDURE — 82565 ASSAY OF CREATININE: CPT
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
## 2021-01-05 ENCOUNTER — HOSPITAL ENCOUNTER (OUTPATIENT)
Dept: RADIOLOGY | Facility: HOSPITAL | Age: 71
Discharge: HOME | End: 2021-01-05
Attending: PHYSICIAN ASSISTANT
Payer: MEDICARE

## 2021-01-05 DIAGNOSIS — Z98.1 HISTORY OF FUSION OF CERVICAL SPINE: ICD-10-CM

## 2021-01-05 DIAGNOSIS — M47.892 OTHER SPONDYLOSIS, CERVICAL REGION: ICD-10-CM

## 2021-01-05 DIAGNOSIS — M54.12 RADICULOPATHY, CERVICAL REGION: ICD-10-CM

## 2021-01-05 DIAGNOSIS — M47.812 CERVICAL SPONDYLOSIS: ICD-10-CM

## 2021-01-05 PROCEDURE — 72156 MRI NECK SPINE W/O & W/DYE: CPT | Mod: TC

## 2021-01-05 PROCEDURE — 72050 X-RAY EXAM NECK SPINE 4/5VWS: CPT | Mod: TC

## 2021-01-05 PROCEDURE — 72156 MRI CERVICAL SPINE W WO CONTRAST: ICD-10-PCS | Mod: 26,,, | Performed by: RADIOLOGY

## 2021-01-05 PROCEDURE — 72156 MRI NECK SPINE W/O & W/DYE: CPT | Mod: 26,,, | Performed by: RADIOLOGY

## 2021-01-05 PROCEDURE — 72050 XR CERVICAL SPINE AP LAT WITH FLEX EXTEN: ICD-10-PCS | Mod: 26,,, | Performed by: RADIOLOGY

## 2021-01-05 PROCEDURE — 25500020 PHARM REV CODE 255: Performed by: PHYSICIAN ASSISTANT

## 2021-01-05 PROCEDURE — A9585 GADOBUTROL INJECTION: HCPCS | Performed by: PHYSICIAN ASSISTANT

## 2021-01-05 PROCEDURE — 72050 X-RAY EXAM NECK SPINE 4/5VWS: CPT | Mod: 26,,, | Performed by: RADIOLOGY

## 2021-01-05 RX ORDER — GADOBUTROL 604.72 MG/ML
7 INJECTION INTRAVENOUS
Status: COMPLETED | OUTPATIENT
Start: 2021-01-05 | End: 2021-01-05

## 2021-01-05 RX ADMIN — GADOBUTROL 7 ML: 604.72 INJECTION INTRAVENOUS at 11:01

## 2021-01-12 ENCOUNTER — OFFICE VISIT (OUTPATIENT)
Dept: NEUROSURGERY | Facility: CLINIC | Age: 71
End: 2021-01-12
Payer: MEDICARE

## 2021-01-12 ENCOUNTER — TELEPHONE (OUTPATIENT)
Dept: NEUROSURGERY | Facility: CLINIC | Age: 71
End: 2021-01-12

## 2021-01-12 VITALS
HEART RATE: 77 BPM | BODY MASS INDEX: 23.54 KG/M2 | HEIGHT: 69 IN | RESPIRATION RATE: 16 BRPM | WEIGHT: 158.94 LBS | SYSTOLIC BLOOD PRESSURE: 174 MMHG | DIASTOLIC BLOOD PRESSURE: 90 MMHG

## 2021-01-12 DIAGNOSIS — M47.812 CERVICAL SPONDYLOSIS: Primary | ICD-10-CM

## 2021-01-12 DIAGNOSIS — M54.12 RADICULOPATHY, CERVICAL REGION: ICD-10-CM

## 2021-01-12 DIAGNOSIS — Z98.1 HISTORY OF FUSION OF CERVICAL SPINE: ICD-10-CM

## 2021-01-12 PROCEDURE — 99213 PR OFFICE/OUTPT VISIT, EST, LEVL III, 20-29 MIN: ICD-10-PCS | Mod: S$PBB,,, | Performed by: PHYSICIAN ASSISTANT

## 2021-01-12 PROCEDURE — 99215 OFFICE O/P EST HI 40 MIN: CPT | Mod: PBBFAC | Performed by: PHYSICIAN ASSISTANT

## 2021-01-12 PROCEDURE — 99213 OFFICE O/P EST LOW 20 MIN: CPT | Mod: S$PBB,,, | Performed by: PHYSICIAN ASSISTANT

## 2021-01-12 PROCEDURE — 99999 PR PBB SHADOW E&M-EST. PATIENT-LVL V: CPT | Mod: PBBFAC,,, | Performed by: PHYSICIAN ASSISTANT

## 2021-01-12 PROCEDURE — 99999 PR PBB SHADOW E&M-EST. PATIENT-LVL V: ICD-10-PCS | Mod: PBBFAC,,, | Performed by: PHYSICIAN ASSISTANT

## 2021-01-27 DIAGNOSIS — M54.12 RIGHT CERVICAL RADICULOPATHY: Primary | ICD-10-CM

## 2021-01-28 ENCOUNTER — TELEPHONE (OUTPATIENT)
Dept: PAIN MEDICINE | Facility: CLINIC | Age: 71
End: 2021-01-28

## 2021-02-01 ENCOUNTER — TELEPHONE (OUTPATIENT)
Dept: PAIN MEDICINE | Facility: CLINIC | Age: 71
End: 2021-02-01

## 2021-04-30 ENCOUNTER — TELEPHONE (OUTPATIENT)
Dept: PAIN MEDICINE | Facility: CLINIC | Age: 71
End: 2021-04-30

## 2021-07-23 ENCOUNTER — TELEPHONE (OUTPATIENT)
Dept: PAIN MEDICINE | Facility: CLINIC | Age: 71
End: 2021-07-23

## 2021-08-10 ENCOUNTER — TELEPHONE (OUTPATIENT)
Dept: NEUROSURGERY | Facility: CLINIC | Age: 71
End: 2021-08-10

## 2021-08-11 ENCOUNTER — OFFICE VISIT (OUTPATIENT)
Dept: NEUROSURGERY | Facility: CLINIC | Age: 71
End: 2021-08-11
Payer: MEDICARE

## 2021-08-11 ENCOUNTER — TELEPHONE (OUTPATIENT)
Dept: PAIN MEDICINE | Facility: CLINIC | Age: 71
End: 2021-08-11

## 2021-08-11 DIAGNOSIS — Z98.1 HISTORY OF FUSION OF CERVICAL SPINE: ICD-10-CM

## 2021-08-11 DIAGNOSIS — M50.30 DDD (DEGENERATIVE DISC DISEASE), CERVICAL: ICD-10-CM

## 2021-08-11 DIAGNOSIS — M54.12 RADICULOPATHY, CERVICAL REGION: Primary | ICD-10-CM

## 2021-08-11 DIAGNOSIS — M47.812 CERVICAL SPONDYLOSIS: ICD-10-CM

## 2021-08-11 PROCEDURE — 99442 PR PHYSICIAN TELEPHONE EVALUATION 11-20 MIN: CPT | Mod: 95,,, | Performed by: PHYSICIAN ASSISTANT

## 2021-08-11 PROCEDURE — 99442 PR PHYSICIAN TELEPHONE EVALUATION 11-20 MIN: ICD-10-PCS | Mod: 95,,, | Performed by: PHYSICIAN ASSISTANT

## 2021-08-16 DIAGNOSIS — M54.12 RIGHT CERVICAL RADICULOPATHY: Primary | ICD-10-CM

## 2022-03-21 ENCOUNTER — TELEPHONE (OUTPATIENT)
Dept: PAIN MEDICINE | Facility: CLINIC | Age: 72
End: 2022-03-21
Payer: MEDICARE

## 2022-05-11 ENCOUNTER — TELEPHONE (OUTPATIENT)
Dept: PAIN MEDICINE | Facility: CLINIC | Age: 72
End: 2022-05-11
Payer: MEDICARE

## 2022-05-13 ENCOUNTER — TELEPHONE (OUTPATIENT)
Dept: PAIN MEDICINE | Facility: CLINIC | Age: 72
End: 2022-05-13
Payer: MEDICARE

## 2022-05-16 ENCOUNTER — TELEPHONE (OUTPATIENT)
Dept: SPORTS MEDICINE | Facility: CLINIC | Age: 72
End: 2022-05-16
Payer: MEDICARE

## 2022-05-16 DIAGNOSIS — M25.562 PAIN IN BOTH KNEES, UNSPECIFIED CHRONICITY: Primary | ICD-10-CM

## 2022-05-16 DIAGNOSIS — M25.561 PAIN IN BOTH KNEES, UNSPECIFIED CHRONICITY: Primary | ICD-10-CM

## 2022-05-16 NOTE — TELEPHONE ENCOUNTER
Spoke with patient's wife and asked that patient arrive 30 minutes prior to appt for xray.  Patient's wife verbalized understanding.

## 2022-05-17 ENCOUNTER — HOSPITAL ENCOUNTER (OUTPATIENT)
Dept: RADIOLOGY | Facility: HOSPITAL | Age: 72
Discharge: HOME | End: 2022-05-17
Attending: PHYSICAL MEDICINE & REHABILITATION
Payer: MEDICARE

## 2022-05-17 ENCOUNTER — OFFICE VISIT (OUTPATIENT)
Dept: SPORTS MEDICINE | Facility: CLINIC | Age: 72
End: 2022-05-17
Payer: MEDICARE

## 2022-05-17 VITALS — BODY MASS INDEX: 23.4 KG/M2 | WEIGHT: 158 LBS | HEIGHT: 69 IN

## 2022-05-17 DIAGNOSIS — M25.561 PAIN IN BOTH KNEES, UNSPECIFIED CHRONICITY: Primary | ICD-10-CM

## 2022-05-17 DIAGNOSIS — M25.562 PAIN AND SWELLING OF LEFT KNEE: ICD-10-CM

## 2022-05-17 DIAGNOSIS — M25.462 PAIN AND SWELLING OF LEFT KNEE: ICD-10-CM

## 2022-05-17 DIAGNOSIS — M25.461 PAIN AND SWELLING OF RIGHT KNEE: ICD-10-CM

## 2022-05-17 DIAGNOSIS — M25.561 PAIN AND SWELLING OF RIGHT KNEE: ICD-10-CM

## 2022-05-17 DIAGNOSIS — M25.562 PAIN IN BOTH KNEES, UNSPECIFIED CHRONICITY: ICD-10-CM

## 2022-05-17 DIAGNOSIS — M25.562 PAIN IN BOTH KNEES, UNSPECIFIED CHRONICITY: Primary | ICD-10-CM

## 2022-05-17 DIAGNOSIS — M25.561 PAIN IN BOTH KNEES, UNSPECIFIED CHRONICITY: ICD-10-CM

## 2022-05-17 PROCEDURE — 73564 XR KNEE ORTHO BILAT WITH FLEXION: ICD-10-PCS | Mod: 26,50,, | Performed by: RADIOLOGY

## 2022-05-17 PROCEDURE — 99999 PR PBB SHADOW E&M-EST. PATIENT-LVL IV: ICD-10-PCS | Mod: PBBFAC,,, | Performed by: PHYSICAL MEDICINE & REHABILITATION

## 2022-05-17 PROCEDURE — 73564 X-RAY EXAM KNEE 4 OR MORE: CPT | Mod: 26,50,, | Performed by: RADIOLOGY

## 2022-05-17 PROCEDURE — 73564 X-RAY EXAM KNEE 4 OR MORE: CPT | Mod: TC,50

## 2022-05-17 PROCEDURE — 99214 OFFICE O/P EST MOD 30 MIN: CPT | Mod: PBBFAC | Performed by: PHYSICAL MEDICINE & REHABILITATION

## 2022-05-17 PROCEDURE — 99204 PR OFFICE/OUTPT VISIT, NEW, LEVL IV, 45-59 MIN: ICD-10-PCS | Mod: S$PBB,,, | Performed by: PHYSICAL MEDICINE & REHABILITATION

## 2022-05-17 PROCEDURE — 99999 PR PBB SHADOW E&M-EST. PATIENT-LVL IV: CPT | Mod: PBBFAC,,, | Performed by: PHYSICAL MEDICINE & REHABILITATION

## 2022-05-17 PROCEDURE — 99204 OFFICE O/P NEW MOD 45 MIN: CPT | Mod: S$PBB,,, | Performed by: PHYSICAL MEDICINE & REHABILITATION

## 2022-05-17 RX ORDER — DICLOFENAC SODIUM 10 MG/G
2 GEL TOPICAL 4 TIMES DAILY
Qty: 1 EACH | Refills: 2 | Status: SHIPPED | OUTPATIENT
Start: 2022-05-17

## 2022-05-17 NOTE — PROGRESS NOTES
SPORTS MEDICINE / PM&R New Patient Visit :    Referring Physician: Self, Aaareferral    Chief Complaint   Patient presents with    Right Knee - Pain    Left Knee - Pain       HPI: This is a 72 y.o.  male being seen in clinic today for evaluation of Pain of the Right Knee and Pain of the Left Knee      The problem began 2 years ago.  He feels swollen pain in his left knee . The symptoms show no change. He has tried oxycodone, tylenol, NSAIDS and Voltaren gel without improvement. He has tried therapy in [place], [place].    History obtained from patient.  Patient states that he has had bilateral knee pain for 2 years but states that his left knee became very swollen in December.  He was taken by ambulance from [place] to [facility] in [place] and spent 3 days in the hospital.  He states that his knee was aspirated in the hospital and that he received physical therapy on his knee when he was released from the hospital.  He continues to have left knee pain greater than the right and continues to have swelling in the left knee.      Past family, medical, social, surgical history, and vital signs reviewed in chart.    General    Nursing note and vitals reviewed.  Constitutional: He is oriented to person, place, and time. He appears well-developed and well-nourished.   HENT:   Head: Normocephalic and atraumatic.   Eyes: Conjunctivae and EOM are normal. Pupils are equal, round, and reactive to light.   Neck: Neck supple.   Cardiovascular: Intact distal pulses.    Pulmonary/Chest: Effort normal. No respiratory distress.   Abdominal: He exhibits no distension.   Neurological: He is alert and oriented to person, place, and time. He has normal reflexes.   Psychiatric: He has a normal mood and affect.     General Musculoskeletal Exam   Gait: normal       Right Knee Exam     Inspection   Erythema: absent  Swelling: absent  Effusion: absent    Range of Motion   The patient has normal right knee ROM.    Tests   Meniscus    Joshua:  Medial - negative Lateral - negative  Ligament Examination Lachman: normal (-1 to 2mm) PCL-Posterior Drawer: normal (0 to 2mm)     MCL - Valgus: normal (0 to 2mm)  LCL - Varus: normal  Patella   Patellar apprehension: negative  Patellar Tracking: normal    Other   Sensation: normal    Left Knee Exam     Inspection   Erythema: absent  Swelling: absent  Effusion: absent    Tenderness   The patient tender to palpation of the medial joint line and lateral joint line.    Range of Motion   The patient has normal left knee ROM.    Tests   Meniscus   Joshua:  Medial - negative Lateral - negative  Stability Lachman: normal (-1 to 2mm) PCL-Posterior Drawer: normal (0 to 2mm)  MCL - Valgus: normal (0 to 2mm)  LCL - Varus: normal (0 to 2mm)  Patella   Patellar apprehension: negative  Patellar Tracking: normal    Other   Sensation: normal    Right Hip Exam   Right hip exam is normal.     Tests   Pain w/ forced internal rotation ([place]ADISON): absent  Left Hip Exam   Left hip exam is normal.    Tests   Pain w/ forced internal rotation (MADISON): absent          Muscle Strength   Right Lower Extremity   Hip Abduction: 5/5   Hip Adduction: 5/5   Hip Flexion: 5/5   Quadriceps:  5/5   Hamstrin/5   Left Lower Extremity   Hip Abduction: 5/5   Hip Adduction: 5/5   Hip Flexion: 5/5   Quadriceps:  5/5   Hamstrin/5     Reflexes     Left Side  Achilles:  2+  Quadriceps:  2+    Right Side   Achilles:  2+  Quadriceps:  2+    Vascular Exam     Right Pulses  Dorsalis Pedis:      2+          Left Pulses  Dorsalis Pedis:      2+              X-ray Knee Ortho Bilateral with Flexion  Narrative: EXAMINATION:  XR KNEE ORTHO BILAT WITH FLEXION    CLINICAL HISTORY:  Pain in right knee    TECHNIQUE:  AP standing of both knees, PA flexion standing views of both knees, and Merchant views of both knees were performed.  Lateral views of both knees were also performed.    COMPARISON:  None    FINDINGS:  There is no fracture, dislocation or  knee joint effusion.  There is extensive arterial vascular calcification in both legs.  There are minimal bilateral degenerative changes in the medial compartments and at the patellofemoral joints.  Impression: Minor degenerative changes with no acute abnormality.  Atherosclerosis.    Electronically signed by: Radha Dyer  Date:    05/17/2022  Time:    12:38         Patient Instructions     Assessment:  Bhupinder Bowling Jr. is a 72 y.o. male   Chief Complaint   Patient presents with    Right Knee - Pain    Left Knee - Pain       Pain in both knees, unspecified chronicity  -     diclofenac sodium (VOLTAREN) 1 % Gel; Apply 2 g topically 4 (four) times daily.  Dispense: 1 each; Refill: 2    Pain and swelling of left knee    Pain and swelling of right knee      Plan:   We personally reviewed his imaging today in clinic and agree with the radiologist's report.   Follow up with Primary Care Provider for Gout    Work on getting records from hospital visit [facility] for next visit   Voltaren gel    Recommend trying Voltaren Gel, or generic diclofenac gel, over the counter, and following the directions on the package (4 times daily, give at least a week to see if it helps. Use it for 3 weeks on / 1 week off).   Remember that you may need to use it consistently for several days before seeing results.          Follow-up:  As needed with Dr. Antwan Padgett or Dr. Americo Kevin or sooner if there are any problems between now and then.    Thank you for choosing [facility] and Dr. Alexandra Stapleton for your orthopedic & sports medicine care. It is our goal to provide you with exceptional care that will help keep you healthy, active, and get you back in the game.    Please do not hesitate to reach out to us via email, phone, or MyChart with any questions, concerns, or feedback.    If you felt that you received exemplary care today, please consider leaving us feedback on Health[employer]  at:  [email or web address]     If you are experiencing pain/discomfort ,or have questions after 5pm and would like to be connected to the [facility] on-call team, please call this number and specify which Sports Medicine provider is treating you: [phone]         Disclaimer: This note was prepared using a voice recognition system and is likely to have sound alike errors within the text.     Alexandra Stapleton M.D.  Sports Medicine

## 2022-05-17 NOTE — PATIENT INSTRUCTIONS
Assessment:  Bhupinder Bowling Jr. is a 72 y.o. male   Chief Complaint   Patient presents with    Right Knee - Pain    Left Knee - Pain       Pain in both knees, unspecified chronicity  -     diclofenac sodium (VOLTAREN) 1 % Gel; Apply 2 g topically 4 (four) times daily.  Dispense: 1 each; Refill: 2    Pain and swelling of left knee    Pain and swelling of right knee      Plan:  We personally reviewed his imaging today in clinic and agree with the radiologist's report.  Follow up with Primary Care Provider for Gout   Work on getting records from hospital visit at [facility] for next visit  Voltaren gel    Recommend trying Voltaren Gel, or generic diclofenac gel, over the counter, and following the directions on the package (4 times daily, give at least a week to see if it helps. Use it for 3 weeks on / 1 week off).   Remember that you may need to use it consistently for several days before seeing results.          Follow-up:  As needed with Dr. Antwan Pdagett or Dr. Americo Kevin or sooner if there are any problems between now and then.    Thank you for choosing [facility] and Dr. Alexandra Stapleton for your orthopedic & sports medicine care. It is our goal to provide you with exceptional care that will help keep you healthy, active, and get you back in the game.    Please do not hesitate to reach out to us via email, phone, or MyChart with any questions, concerns, or feedback.    If you felt that you received exemplary care today, please consider leaving us feedback on Healthgrades at:  [email or web address]     If you are experiencing pain/discomfort ,or have questions after 5pm and would like to be connected to the [facility] on-call team, please call this number and specify which Sports Medicine provider is treating you: [phone]

## 2022-11-11 ENCOUNTER — TELEPHONE (OUTPATIENT)
Dept: NEUROSURGERY | Facility: CLINIC | Age: 72
End: 2022-11-11
Payer: MEDICARE

## 2022-11-11 NOTE — TELEPHONE ENCOUNTER
I spoke with the pt wife in regard to her msg below and she stated she was told the pt medical records have to be requested from the hospital medical records dept.    Understanding was verbalized        ----- Message from Obi Herrera sent at 11/11/2022  9:23 AM CST -----  Contact: Maureen (Wife)  Maureen would like a call back at [phone], in regards to medical records, and also a letter that was provided.

## 2022-11-17 ENCOUNTER — TELEPHONE (OUTPATIENT)
Dept: NEUROSURGERY | Facility: CLINIC | Age: 72
End: 2022-11-17
Payer: MEDICARE

## 2022-11-17 NOTE — TELEPHONE ENCOUNTER
I spoke with the pt and his wife in regarding a f/u appt  for the inj he and Kings spoke about during his last visit. I was able to get the pt scheduled with a f/u appt on 12/8.    The pt verbalized understanding if his new appt date, time and location.      ----- Message from Marycruz Cannon sent at 11/17/2022 12:43 PM CST -----  Contact: asa  .Type:  Sooner Apoointment Request    Caller is requesting a sooner appointment.  Caller declined first available appointment listed below.  Caller will not accept being placed on the waitlist and is requesting a message be sent to doctor.  Name of Caller:asa  When is the first available appointment?January 3rd  Symptoms:neck pain  Would the patient rather a call back or a response via [facility]? na  Best Call Back [phone]  Additional Information: gen        Thanks   TPW

## 2022-12-08 ENCOUNTER — OFFICE VISIT (OUTPATIENT)
Dept: NEUROSURGERY | Facility: CLINIC | Age: 72
End: 2022-12-08
Payer: MEDICARE

## 2022-12-08 VITALS
WEIGHT: 156.5 LBS | SYSTOLIC BLOOD PRESSURE: 194 MMHG | RESPIRATION RATE: 18 BRPM | DIASTOLIC BLOOD PRESSURE: 95 MMHG | HEIGHT: 69 IN | HEART RATE: 101 BPM | BODY MASS INDEX: 23.18 KG/M2

## 2022-12-08 DIAGNOSIS — M54.2 CERVICALGIA: Primary | ICD-10-CM

## 2022-12-08 DIAGNOSIS — M54.12 RADICULOPATHY, CERVICAL REGION: ICD-10-CM

## 2022-12-08 PROCEDURE — 99999 PR PBB SHADOW E&M-EST. PATIENT-LVL III: CPT | Mod: PBBFAC,,, | Performed by: PHYSICIAN ASSISTANT

## 2022-12-08 PROCEDURE — 99999 PR PBB SHADOW E&M-EST. PATIENT-LVL III: ICD-10-PCS | Mod: PBBFAC,,, | Performed by: PHYSICIAN ASSISTANT

## 2022-12-08 PROCEDURE — 99213 OFFICE O/P EST LOW 20 MIN: CPT | Mod: PBBFAC,PO | Performed by: PHYSICIAN ASSISTANT

## 2022-12-08 PROCEDURE — 99213 PR OFFICE/OUTPT VISIT, EST, LEVL III, 20-29 MIN: ICD-10-PCS | Mod: S$PBB,,, | Performed by: PHYSICIAN ASSISTANT

## 2022-12-08 PROCEDURE — 99213 OFFICE O/P EST LOW 20 MIN: CPT | Mod: S$PBB,,, | Performed by: PHYSICIAN ASSISTANT

## 2022-12-08 RX ORDER — TAMSULOSIN HYDROCHLORIDE 0.4 MG/1
1 CAPSULE ORAL
COMMUNITY
Start: 2022-10-25

## 2022-12-08 RX ORDER — SILDENAFIL 100 MG/1
100 TABLET, FILM COATED ORAL DAILY PRN
COMMUNITY
Start: 2022-11-27

## 2022-12-08 RX ORDER — TADALAFIL 20 MG/1
20 TABLET ORAL DAILY PRN
COMMUNITY
Start: 2022-11-17

## 2022-12-08 RX ORDER — HYOSCYAMINE SULFATE 0.12 MG/1
0.12 TABLET, ORALLY DISINTEGRATING ORAL
COMMUNITY
Start: 2022-05-05

## 2022-12-08 RX ORDER — GLIPIZIDE 10 MG/1
10 TABLET ORAL EVERY MORNING
COMMUNITY
Start: 2022-11-07

## 2022-12-08 RX ORDER — SULFAMETHOXAZOLE AND TRIMETHOPRIM 800; 160 MG/1; MG/1
1 TABLET ORAL 2 TIMES DAILY
COMMUNITY
Start: 2022-07-28
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
## 2023-01-05 ENCOUNTER — HOSPITAL ENCOUNTER (OUTPATIENT)
Dept: RADIOLOGY | Facility: HOSPITAL | Age: 73
Discharge: HOME | End: 2023-01-05
Attending: PHYSICIAN ASSISTANT
Payer: MEDICARE

## 2023-01-05 ENCOUNTER — OFFICE VISIT (OUTPATIENT)
Dept: NEUROSURGERY | Facility: CLINIC | Age: 73
End: 2023-01-05
Payer: MEDICARE

## 2023-01-05 VITALS
DIASTOLIC BLOOD PRESSURE: 80 MMHG | WEIGHT: 156 LBS | HEART RATE: 71 BPM | SYSTOLIC BLOOD PRESSURE: 173 MMHG | HEIGHT: 69 IN | BODY MASS INDEX: 23.11 KG/M2 | RESPIRATION RATE: 18 BRPM

## 2023-01-05 DIAGNOSIS — M54.12 RADICULOPATHY, CERVICAL REGION: ICD-10-CM

## 2023-01-05 DIAGNOSIS — M54.2 CERVICALGIA: ICD-10-CM

## 2023-01-05 DIAGNOSIS — M50.30 DDD (DEGENERATIVE DISC DISEASE), CERVICAL: Primary | ICD-10-CM

## 2023-01-05 DIAGNOSIS — M79.672 CHRONIC FOOT PAIN, LEFT: ICD-10-CM

## 2023-01-05 DIAGNOSIS — G89.29 CHRONIC FOOT PAIN, LEFT: ICD-10-CM

## 2023-01-05 PROCEDURE — 99999 PR PBB SHADOW E&M-EST. PATIENT-LVL IV: ICD-10-PCS | Mod: PBBFAC,,, | Performed by: PHYSICIAN ASSISTANT

## 2023-01-05 PROCEDURE — 73130 XR HAND COMPLETE 3 VIEWS BILATERAL: ICD-10-PCS | Mod: 26,50,, | Performed by: RADIOLOGY

## 2023-01-05 PROCEDURE — 73130 X-RAY EXAM OF HAND: CPT | Mod: TC,50

## 2023-01-05 PROCEDURE — 72141 MRI CERVICAL SPINE WITHOUT CONTRAST: ICD-10-PCS | Mod: 26,,, | Performed by: RADIOLOGY

## 2023-01-05 PROCEDURE — 73130 X-RAY EXAM OF HAND: CPT | Mod: 26,50,, | Performed by: RADIOLOGY

## 2023-01-05 PROCEDURE — 99999 PR PBB SHADOW E&M-EST. PATIENT-LVL IV: CPT | Mod: PBBFAC,,, | Performed by: PHYSICIAN ASSISTANT

## 2023-01-05 PROCEDURE — 99214 OFFICE O/P EST MOD 30 MIN: CPT | Mod: S$PBB,,, | Performed by: PHYSICIAN ASSISTANT

## 2023-01-05 PROCEDURE — 72141 MRI NECK SPINE W/O DYE: CPT | Mod: 26,,, | Performed by: RADIOLOGY

## 2023-01-05 PROCEDURE — 99214 PR OFFICE/OUTPT VISIT, EST, LEVL IV, 30-39 MIN: ICD-10-PCS | Mod: S$PBB,,, | Performed by: PHYSICIAN ASSISTANT

## 2023-01-05 PROCEDURE — 72141 MRI NECK SPINE W/O DYE: CPT | Mod: TC

## 2023-01-05 PROCEDURE — 99214 OFFICE O/P EST MOD 30 MIN: CPT | Mod: PBBFAC,25,PO | Performed by: PHYSICIAN ASSISTANT

## 2023-01-06 ENCOUNTER — TELEPHONE (OUTPATIENT)
Dept: NEUROSURGERY | Facility: CLINIC | Age: 73
End: 2023-01-06
Payer: MEDICARE

## 2023-01-06 NOTE — TELEPHONE ENCOUNTER
I tried calling the pt wife and was unsuccessful      ----- Message from Leslie Avery sent at 1/6/2023 12:22 PM CST -----  Contact: Ada/Spouse  Type:  Patient Requesting Referral    Who Called:Ada  Does the patient already have the specialty appointment scheduled?:No  If yes, what is the date of that appointment?:N/a  Referral to What Specialty:Pain Management  Reason for Referral:Referral for a Medicaid patient  Does the patient want the referral with a specific physician?:Yes, Dr. Zion Ghosh  [facility] the [facility]  Patient Requesting a Response?:Yes  Would the patient rather a call back or a response via [facility]? Call or text   Best Call Back Number:[phone] or [phone]  Additional Information: Request referral is faxed to [facility] is faxed to [phone].   Thank you,  GH

## 2023-01-11 ENCOUNTER — HOSPITAL ENCOUNTER (OUTPATIENT)
Dept: RADIOLOGY | Facility: HOSPITAL | Age: 73
Discharge: HOME | End: 2023-01-11
Attending: PHYSICIAN ASSISTANT
Payer: MEDICARE

## 2023-01-11 DIAGNOSIS — M54.2 CERVICALGIA: ICD-10-CM

## 2023-01-11 PROCEDURE — 72125 CT NECK SPINE W/O DYE: CPT | Mod: 26,,, | Performed by: RADIOLOGY

## 2023-01-11 PROCEDURE — 72125 CT NECK SPINE W/O DYE: CPT | Mod: TC

## 2023-01-11 PROCEDURE — 72125 CT CERVICAL SPINE WITHOUT CONTRAST: ICD-10-PCS | Mod: 26,,, | Performed by: RADIOLOGY

## 2023-01-13 ENCOUNTER — TELEPHONE (OUTPATIENT)
Dept: NEUROSURGERY | Facility: CLINIC | Age: 73
End: 2023-01-13
Payer: MEDICARE

## 2023-01-13 DIAGNOSIS — M54.2 CERVICALGIA: Primary | ICD-10-CM

## 2023-01-13 NOTE — TELEPHONE ENCOUNTER
----- Message from Kings Cordero PA-C sent at 1/13/2023  1:30 PM CST -----  Contact: pt wife/[phone]  Yes, that is fine. Thanks!  ----- Message -----  From: India Lyle MA  Sent: 1/12/2023   2:37 PM CST  To: Kings Cordero PA-C    Hey Mrs Cordero are you ok with putting in a external order to Pain for this pt?   ----- Message -----  From: Amarilis Holley  Sent: 1/12/2023   1:45 PM CST  To: Gil BELTRÁN Staff    Pt wife is calling in regards to status of referral that was supposed to be sent to pain management [facility] Fax:[phone].Please give her a call back at [phone]. Thank you s/g

## 2023-01-13 NOTE — TELEPHONE ENCOUNTER
Left vm with pts wife informed her that the Pain Management order was faxed over to Dr Brennan Ghosh office left call back # for pt to return call if he had any further questions.

## 2023-01-19 ENCOUNTER — OFFICE VISIT (OUTPATIENT)
Dept: NEUROSURGERY | Facility: CLINIC | Age: 73
End: 2023-01-19
Payer: MEDICARE

## 2023-01-19 VITALS
SYSTOLIC BLOOD PRESSURE: 155 MMHG | DIASTOLIC BLOOD PRESSURE: 84 MMHG | HEIGHT: 69 IN | WEIGHT: 140.63 LBS | BODY MASS INDEX: 20.83 KG/M2 | RESPIRATION RATE: 18 BRPM | HEART RATE: 67 BPM

## 2023-01-19 DIAGNOSIS — M50.30 DEGENERATIVE DISC DISEASE, CERVICAL: Primary | ICD-10-CM

## 2023-01-19 DIAGNOSIS — M54.2 CERVICALGIA: ICD-10-CM

## 2023-01-19 PROCEDURE — 99213 OFFICE O/P EST LOW 20 MIN: CPT | Mod: S$PBB,,, | Performed by: NEUROLOGICAL SURGERY

## 2023-01-19 PROCEDURE — 99999 PR PBB SHADOW E&M-EST. PATIENT-LVL III: ICD-10-PCS | Mod: PBBFAC,,, | Performed by: NEUROLOGICAL SURGERY

## 2023-01-19 PROCEDURE — 99213 OFFICE O/P EST LOW 20 MIN: CPT | Mod: PBBFAC,PO | Performed by: NEUROLOGICAL SURGERY

## 2023-01-19 PROCEDURE — 99213 PR OFFICE/OUTPT VISIT, EST, LEVL III, 20-29 MIN: ICD-10-PCS | Mod: S$PBB,,, | Performed by: NEUROLOGICAL SURGERY

## 2023-01-19 PROCEDURE — 99999 PR PBB SHADOW E&M-EST. PATIENT-LVL III: CPT | Mod: PBBFAC,,, | Performed by: NEUROLOGICAL SURGERY
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
## 2023-01-22 ENCOUNTER — TELEPHONE (OUTPATIENT)
Dept: NEUROSURGERY | Facility: CLINIC | Age: 73
End: 2023-01-22
Payer: MEDICARE

## 2023-01-22 DIAGNOSIS — M54.2 CERVICALGIA: ICD-10-CM

## 2023-01-22 DIAGNOSIS — M50.30 DDD (DEGENERATIVE DISC DISEASE), CERVICAL: ICD-10-CM

## 2023-01-22 DIAGNOSIS — M50.30 DEGENERATIVE DISC DISEASE, CERVICAL: Primary | ICD-10-CM

## 2023-01-22 NOTE — TELEPHONE ENCOUNTER
----- Message from Mira Irene MA sent at 1/18/2023  2:44 PM CST -----  Contact: Ada/wife  If appropriate can you please drop this order.    Thanks  ----- Message -----  From: Arianne Bañuelos  Sent: 1/18/2023   1:59 PM CST  To: Gil BELTRÁN Staff    Patient wife is calling to request referral for Pain to Fax over to [facility] at [phone] and call if have ginny gross question or concerns at [phone](office) Dr. Brennan Ghosh as requested.  Thanks  LR
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
## 2023-03-13 NOTE — PROGRESS NOTES
RHEUMATOLOGY OUTPATIENT CLINIC NOTE    03/14/2023    Subjective:       Patient ID: Bhupinder Bowling Jr. is a 72 y.o. male.    Chief Complaint: No chief complaint on file.      HPI       Bhupinder Bowling Jr. is a 72 y.o. pleasant male here for rheumatology initial evaluation. Here for evaluation of psoriatic arthritis.       Has had pain and swelling in his hands for the last couple of years. Swelling in the IP joints with pain lasting all day and is worse with increased activity. Morning stiffness lasting hours. Also with pain in his feet L>R. Occasional numbness in his hands. Also has neck and back pain for which he follows with neurosurgery. Takes oxycontin and roxicodone. Prior celebrex, mobic, tylenol. Unsure if those provided much relief.       No warmth to joints. No muscle weakness. Denies dactylitis.  No fever, lymphadenopathy, no unexpected weight loss, no fatigue  No rashes on palms, no vasculitis. No history of PsO.   No shortness of breath or pleuritic chest pain.   No ocular inflammation. No GI issues.     Rheumatologic review of systems negative otherwise.       Fam hx: No known family history   Tobacco use: None  Alcohol use: No   Occupation: Retired        Prior therapies:  Celebrex - upset stomach   Meloxicam - upset stomach   Oxycodone   Roxicodone        Physical Exam:  Enlargements multiple PIP and DIP joints jama hands. TTP mainly L MCP and index PIP. No obvious synovitis, no erythema, no increased warmth to any joints jama UE/LE. Significantly decreased fist formation jama hands. Tender MTP squeeze bilaterally. No dactylitis. No enthesitis. No tenosynovitis. Strength 5/5 jama ue/le. No obvious Pso or other rash.         Past Medical History:   Diagnosis Date    HTN (hypertension)     Pre-diabetes      Past Surgical History:   Procedure Laterality Date    BACK SURGERY      THROAT SURGERY       History reviewed. No pertinent family history.  Social History     Socioeconomic History     "Marital status:    Tobacco Use    Smoking status: Never   Substance and Sexual Activity    Alcohol use: No    Drug use: Never    Sexual activity: Never     Review of patient's allergies indicates:  No Known Allergies        Objective:       BP (!) 156/73 (BP Location: Right arm, Patient Position: Sitting, BP Method: Large (Automatic))   Pulse 76   Resp 16   Ht 5' 9" (1.753 m)   Wt 71.6 kg (157 lb 13.6 oz)   BMI 23.31 kg/m²         There is no immunization history on file for this patient.          No results found for this or any previous visit (from the past 672 hour(s)).     No results found for: TBGOLDPLUS   No results found for: HAV, HEPAIGM, HEPBIGM, HEPBCAB, HBEAG, HEPCAB     Xray hand bilat 1/5/23:  EXAM: XR HAND COMPLETE 3 VIEWS BILATERAL     CLINICAL HISTORY: Bilateral hand pain.     FINDINGS:     The left hand demonstrates no signs of acute fracture.  There is normal joint alignment.  Erosive arthritis involves the interphalangeal joints of the left fingers with adjacent soft tissue swelling.  This is most pronounced involving the PIP joints of the index and ring fingers.  Narrowing of the joint space of the first metacarpophalangeal joint with subtle erosive change.     The right hand demonstrates normal joint alignment with no acute fractures.  Mild degenerative change of the trapeziometacarpal joint.  There is erosive type arthritis involving the DIP joints of the fingers and involving the PIP joint of the little finger.  Degenerative narrowing of the first metacarpophalangeal joint with subchondral sclerosis and mild erosive change.  There is soft tissue swelling about the interphalangeal joints of the fingers.        Impression:        Findings are most suggestive of erosive or psoriatic arthritis.       Assessment:       1. Pain in both hands    2. DDD (degenerative disc disease), cervical    3. Erosive (osteo)arthritis          Impression:   Patient with multiple year history of hand " pain and swelling with inflammatory features.  X-ray of hands with intra-articular erosions suspicious for erosive osteoarthritis or psoriatic arthritis.  Denies any known family history of autoimmune disease, no history of psoriasis.  Previously tried NSAIDs, unsure if they work.  Possible GI upset.  Currently taking oxycodone and oxycodone for pain.    Plan:          Orders Placed This Encounter   Procedures    X-Ray Foot Complete Bilateral    Rheumatoid Factor    Cyclic Citrullinated Peptide Antibody, IgG    TOI Screen w/Reflex    CBC Auto Differential    Comprehensive Metabolic Panel    Sedimentation rate    C-Reactive Protein    HLA B27 ANTIGEN       Above rheumatic workup     Retry NSAID to see if improved pain.  Caution for GI upset.    Consider Tylenol Arthritis as needed.      If suspicion for inflammatory/autoimmune disease, consider methotrexate.    If more consistent with erosive osteoarthritis, consider hydroxychloroquine for symptomatic relief.  If consider hydroxychloroquine, monitor for psoriasis.      Continue following with Neurosurgery for neck and back pain.    If numbness in hands continues, consider EMG.     Labs and foot x-ray today,   Return to clinic 3-4 months with reg4.      Denia Gongora PA-C  [facility]  Rheumatology       45 minutes of total time spent on the encounter, which includes face to face time and non-face to face time preparing to see the patient (eg, review of tests), Obtaining and/or reviewing separately obtained history, Documenting clinical information in the electronic or other health record, Independently interpreting results (not separately reported) and communicating results to the patient/family/caregiver, or Care coordination (not separately reported).       Disclaimer: This note was prepared using voice recognition system and is likely to have sound alike errors and is not proof read.  Please call me with any questions

## 2023-03-14 ENCOUNTER — OFFICE VISIT (OUTPATIENT)
Dept: RHEUMATOLOGY | Facility: CLINIC | Age: 73
End: 2023-03-14
Payer: MEDICARE

## 2023-03-14 ENCOUNTER — HOSPITAL ENCOUNTER (OUTPATIENT)
Dept: RADIOLOGY | Facility: HOSPITAL | Age: 73
Discharge: HOME | End: 2023-03-14
Payer: MEDICARE

## 2023-03-14 VITALS
HEART RATE: 76 BPM | SYSTOLIC BLOOD PRESSURE: 156 MMHG | DIASTOLIC BLOOD PRESSURE: 73 MMHG | HEIGHT: 69 IN | WEIGHT: 157.88 LBS | BODY MASS INDEX: 23.38 KG/M2 | RESPIRATION RATE: 16 BRPM

## 2023-03-14 DIAGNOSIS — M15.4 EROSIVE (OSTEO)ARTHRITIS: ICD-10-CM

## 2023-03-14 DIAGNOSIS — M79.642 PAIN IN BOTH HANDS: Primary | ICD-10-CM

## 2023-03-14 DIAGNOSIS — M79.641 PAIN IN BOTH HANDS: Primary | ICD-10-CM

## 2023-03-14 DIAGNOSIS — M50.30 DDD (DEGENERATIVE DISC DISEASE), CERVICAL: ICD-10-CM

## 2023-03-14 PROCEDURE — 99213 OFFICE O/P EST LOW 20 MIN: CPT | Mod: PBBFAC

## 2023-03-14 PROCEDURE — 99999 PR PBB SHADOW E&M-EST. PATIENT-LVL III: ICD-10-PCS | Mod: PBBFAC,,,

## 2023-03-14 PROCEDURE — 99999 PR PBB SHADOW E&M-EST. PATIENT-LVL III: CPT | Mod: PBBFAC,,,

## 2023-03-14 PROCEDURE — 99215 PR OFFICE/OUTPT VISIT, EST, LEVL V, 40-54 MIN: ICD-10-PCS | Mod: S$PBB,ICN,,

## 2023-03-14 PROCEDURE — 99215 OFFICE O/P EST HI 40 MIN: CPT | Mod: S$PBB,ICN,,

## 2023-04-13 ENCOUNTER — TELEPHONE (OUTPATIENT)
Dept: NEUROSURGERY | Facility: CLINIC | Age: 73
End: 2023-04-13
Payer: MEDICARE

## 2023-04-13 NOTE — TELEPHONE ENCOUNTER
Spoke with pt informed pt that his last appt with dr gong, dr gong stated that he does not recommend sx. Pt ws advised that I will f/u with [facility] and see what she suggest, informed [facility] awaiting response. Pt jd

## 2023-04-13 NOTE — TELEPHONE ENCOUNTER
----- Message from Arianne Bañuelos sent at 4/13/2023 12:58 PM CDT -----  Contact: Mrs. Bowling/wife  Patient is requesting a call back at [phone] regarding procedure.  Thanks  LR

## 2023-04-25 ENCOUNTER — PATIENT MESSAGE (OUTPATIENT)
Dept: RESEARCH | Facility: HOSPITAL | Age: 73
End: 2023-04-25
Payer: MEDICARE

## 2023-05-02 ENCOUNTER — PATIENT MESSAGE (OUTPATIENT)
Dept: RESEARCH | Facility: HOSPITAL | Age: 73
End: 2023-05-02
Payer: MEDICARE

## 2023-05-16 ENCOUNTER — PATIENT MESSAGE (OUTPATIENT)
Dept: RESEARCH | Facility: HOSPITAL | Age: 73
End: 2023-05-16
Payer: MEDICARE

## 2024-06-08 NOTE — TELEPHONE ENCOUNTER
----- Message from Thuy Pike sent at 5/13/2022  2:47 PM CDT -----  Regarding: Reschedule appointment  Contact: Ms Bowling/  spouse  Type:  Reschedule  Appointment Request    Name of Caller Ms Bowling states need to speak with nurse .   Ms Bowling states has question regarding patient appointment scheduled on yesterday.  When is the first available appointment?  Symptoms:  Best Call Back Number:[phone]  Additional Information:        
Patient calling to schedule appointment with Dr. Cordero.  I gave his wife the phone number to call.  
Patient/Caregiver provided printed discharge information.